# Patient Record
Sex: MALE | Race: WHITE | Employment: OTHER | URBAN - METROPOLITAN AREA
[De-identification: names, ages, dates, MRNs, and addresses within clinical notes are randomized per-mention and may not be internally consistent; named-entity substitution may affect disease eponyms.]

---

## 2017-01-11 PROCEDURE — 86480 TB TEST CELL IMMUN MEASURE: CPT | Performed by: PHYSICIAN ASSISTANT

## 2017-02-27 PROBLEM — M79.671 RIGHT FOOT PAIN: Status: ACTIVE | Noted: 2017-02-27

## 2017-02-27 PROBLEM — M20.5X1 HALLUX LIMITUS, RIGHT: Status: ACTIVE | Noted: 2017-02-27

## 2017-04-11 PROBLEM — M06.09 RHEUMATOID ARTHRITIS OF MULTIPLE SITES WITH NEGATIVE RHEUMATOID FACTOR (HCC): Status: ACTIVE | Noted: 2017-04-11

## 2017-04-12 PROBLEM — M54.41 CHRONIC RIGHT-SIDED LOW BACK PAIN WITH RIGHT-SIDED SCIATICA: Status: ACTIVE | Noted: 2017-04-12

## 2017-04-12 PROBLEM — G89.29 CHRONIC RIGHT-SIDED LOW BACK PAIN WITH RIGHT-SIDED SCIATICA: Status: ACTIVE | Noted: 2017-04-12

## 2017-05-11 PROBLEM — M54.41 ACUTE BACK PAIN WITH SCIATICA, RIGHT: Status: ACTIVE | Noted: 2017-05-11

## 2017-05-31 PROCEDURE — 81003 URINALYSIS AUTO W/O SCOPE: CPT | Performed by: INTERNAL MEDICINE

## 2017-05-31 PROCEDURE — 84100 ASSAY OF PHOSPHORUS: CPT | Performed by: INTERNAL MEDICINE

## 2017-05-31 PROCEDURE — 84156 ASSAY OF PROTEIN URINE: CPT | Performed by: INTERNAL MEDICINE

## 2017-05-31 PROCEDURE — 82310 ASSAY OF CALCIUM: CPT | Performed by: INTERNAL MEDICINE

## 2017-05-31 PROCEDURE — 82565 ASSAY OF CREATININE: CPT | Performed by: INTERNAL MEDICINE

## 2017-05-31 PROCEDURE — 82043 UR ALBUMIN QUANTITATIVE: CPT | Performed by: INTERNAL MEDICINE

## 2017-05-31 PROCEDURE — 82570 ASSAY OF URINE CREATININE: CPT | Performed by: INTERNAL MEDICINE

## 2017-05-31 PROCEDURE — 83970 ASSAY OF PARATHORMONE: CPT | Performed by: INTERNAL MEDICINE

## 2017-07-06 RX ORDER — TAMSULOSIN HYDROCHLORIDE 0.4 MG/1
CAPSULE ORAL
Qty: 30 CAPSULE | Refills: 0 | OUTPATIENT
Start: 2017-07-06

## 2017-12-04 PROBLEM — L60.0 INGROWN NAIL OF GREAT TOE OF LEFT FOOT: Status: ACTIVE | Noted: 2017-12-04

## 2018-01-09 PROCEDURE — 89055 LEUKOCYTE ASSESSMENT FECAL: CPT | Performed by: EMERGENCY MEDICINE

## 2018-01-09 PROCEDURE — 87045 FECES CULTURE AEROBIC BACT: CPT | Performed by: EMERGENCY MEDICINE

## 2018-01-09 PROCEDURE — 87046 STOOL CULTR AEROBIC BACT EA: CPT | Performed by: EMERGENCY MEDICINE

## 2018-01-09 PROCEDURE — 87427 SHIGA-LIKE TOXIN AG IA: CPT | Performed by: EMERGENCY MEDICINE

## 2018-01-22 PROCEDURE — 82043 UR ALBUMIN QUANTITATIVE: CPT | Performed by: INTERNAL MEDICINE

## 2018-01-22 PROCEDURE — 82310 ASSAY OF CALCIUM: CPT | Performed by: INTERNAL MEDICINE

## 2018-01-22 PROCEDURE — 82565 ASSAY OF CREATININE: CPT | Performed by: INTERNAL MEDICINE

## 2018-01-22 PROCEDURE — 81003 URINALYSIS AUTO W/O SCOPE: CPT | Performed by: INTERNAL MEDICINE

## 2018-01-22 PROCEDURE — 83970 ASSAY OF PARATHORMONE: CPT | Performed by: INTERNAL MEDICINE

## 2018-01-22 PROCEDURE — 84156 ASSAY OF PROTEIN URINE: CPT | Performed by: INTERNAL MEDICINE

## 2018-01-22 PROCEDURE — 82570 ASSAY OF URINE CREATININE: CPT | Performed by: INTERNAL MEDICINE

## 2018-01-22 PROCEDURE — 84100 ASSAY OF PHOSPHORUS: CPT | Performed by: INTERNAL MEDICINE

## 2018-04-25 PROCEDURE — 87338 HPYLORI STOOL AG IA: CPT | Performed by: INTERNAL MEDICINE

## 2018-07-03 PROCEDURE — 88305 TISSUE EXAM BY PATHOLOGIST: CPT | Performed by: INTERNAL MEDICINE

## 2018-08-22 PROCEDURE — 82310 ASSAY OF CALCIUM: CPT | Performed by: INTERNAL MEDICINE

## 2018-08-22 PROCEDURE — 83970 ASSAY OF PARATHORMONE: CPT | Performed by: INTERNAL MEDICINE

## 2018-08-22 PROCEDURE — 82565 ASSAY OF CREATININE: CPT | Performed by: INTERNAL MEDICINE

## 2018-08-22 PROCEDURE — 84100 ASSAY OF PHOSPHORUS: CPT | Performed by: INTERNAL MEDICINE

## 2018-09-05 PROCEDURE — 84540 ASSAY OF URINE/UREA-N: CPT | Performed by: INTERNAL MEDICINE

## 2018-09-05 PROCEDURE — 81003 URINALYSIS AUTO W/O SCOPE: CPT | Performed by: INTERNAL MEDICINE

## 2018-09-05 PROCEDURE — 82570 ASSAY OF URINE CREATININE: CPT | Performed by: INTERNAL MEDICINE

## 2018-09-05 PROCEDURE — 84156 ASSAY OF PROTEIN URINE: CPT | Performed by: INTERNAL MEDICINE

## 2018-09-05 PROCEDURE — 82043 UR ALBUMIN QUANTITATIVE: CPT | Performed by: INTERNAL MEDICINE

## 2018-09-05 PROCEDURE — 84300 ASSAY OF URINE SODIUM: CPT | Performed by: INTERNAL MEDICINE

## 2018-10-26 PROCEDURE — 82570 ASSAY OF URINE CREATININE: CPT | Performed by: INTERNAL MEDICINE

## 2018-10-26 PROCEDURE — 84100 ASSAY OF PHOSPHORUS: CPT | Performed by: INTERNAL MEDICINE

## 2018-10-26 PROCEDURE — 81003 URINALYSIS AUTO W/O SCOPE: CPT | Performed by: INTERNAL MEDICINE

## 2018-10-26 PROCEDURE — 82310 ASSAY OF CALCIUM: CPT | Performed by: INTERNAL MEDICINE

## 2018-10-26 PROCEDURE — 84300 ASSAY OF URINE SODIUM: CPT | Performed by: INTERNAL MEDICINE

## 2018-10-26 PROCEDURE — 83970 ASSAY OF PARATHORMONE: CPT | Performed by: INTERNAL MEDICINE

## 2018-10-26 PROCEDURE — 82565 ASSAY OF CREATININE: CPT | Performed by: INTERNAL MEDICINE

## 2018-10-26 PROCEDURE — 82043 UR ALBUMIN QUANTITATIVE: CPT | Performed by: INTERNAL MEDICINE

## 2018-10-26 PROCEDURE — 84156 ASSAY OF PROTEIN URINE: CPT | Performed by: INTERNAL MEDICINE

## 2018-10-26 PROCEDURE — 84540 ASSAY OF URINE/UREA-N: CPT | Performed by: INTERNAL MEDICINE

## 2018-10-29 PROBLEM — K21.9 GASTROESOPHAGEAL REFLUX DISEASE WITHOUT ESOPHAGITIS: Status: ACTIVE | Noted: 2018-10-29

## 2018-11-06 PROCEDURE — 82610 CYSTATIN C: CPT | Performed by: INTERNAL MEDICINE

## 2018-11-19 PROCEDURE — 82570 ASSAY OF URINE CREATININE: CPT | Performed by: INTERNAL MEDICINE

## 2018-11-19 PROCEDURE — 84300 ASSAY OF URINE SODIUM: CPT | Performed by: INTERNAL MEDICINE

## 2018-11-19 PROCEDURE — 84540 ASSAY OF URINE/UREA-N: CPT | Performed by: INTERNAL MEDICINE

## 2018-12-04 ENCOUNTER — HOSPITAL ENCOUNTER (OUTPATIENT)
Dept: CARDIOLOGY CLINIC | Facility: HOSPITAL | Age: 78
Discharge: HOME | End: 2018-12-04
Attending: NURSE PRACTITIONER

## 2018-12-04 DIAGNOSIS — Z13.9 ENCOUNTER FOR SCREENING: ICD-10-CM

## 2019-02-06 PROCEDURE — 81015 MICROSCOPIC EXAM OF URINE: CPT | Performed by: PHYSICIAN ASSISTANT

## 2019-02-06 PROCEDURE — 87086 URINE CULTURE/COLONY COUNT: CPT | Performed by: PHYSICIAN ASSISTANT

## 2019-07-09 PROCEDURE — 82310 ASSAY OF CALCIUM: CPT | Performed by: INTERNAL MEDICINE

## 2019-07-09 PROCEDURE — 84100 ASSAY OF PHOSPHORUS: CPT | Performed by: INTERNAL MEDICINE

## 2019-07-09 PROCEDURE — 82565 ASSAY OF CREATININE: CPT | Performed by: INTERNAL MEDICINE

## 2019-07-09 PROCEDURE — 84156 ASSAY OF PROTEIN URINE: CPT | Performed by: INTERNAL MEDICINE

## 2019-07-09 PROCEDURE — 81003 URINALYSIS AUTO W/O SCOPE: CPT | Performed by: INTERNAL MEDICINE

## 2019-07-09 PROCEDURE — 83970 ASSAY OF PARATHORMONE: CPT | Performed by: INTERNAL MEDICINE

## 2019-09-01 PROBLEM — M25.511 ACUTE PAIN OF RIGHT SHOULDER: Status: ACTIVE | Noted: 2019-09-01

## 2024-12-10 ENCOUNTER — HOSPITAL ENCOUNTER (EMERGENCY)
Facility: HOSPITAL | Age: 84
Discharge: HOME | End: 2024-12-10
Attending: EMERGENCY MEDICINE
Payer: COMMERCIAL

## 2024-12-10 ENCOUNTER — APPOINTMENT (OUTPATIENT)
Dept: GENERAL RADIOLOGY | Facility: HOSPITAL | Age: 84
End: 2024-12-10
Payer: COMMERCIAL

## 2024-12-10 VITALS
DIASTOLIC BLOOD PRESSURE: 62 MMHG | RESPIRATION RATE: 23 BRPM | HEIGHT: 70 IN | SYSTOLIC BLOOD PRESSURE: 110 MMHG | OXYGEN SATURATION: 99 % | BODY MASS INDEX: 30.58 KG/M2 | HEART RATE: 63 BPM | TEMPERATURE: 98 F | WEIGHT: 213.63 LBS

## 2024-12-10 DIAGNOSIS — R07.89 CHEST WALL PAIN: Primary | ICD-10-CM

## 2024-12-10 LAB
ALBUMIN SERPL-MCNC: 3.7 G/DL (ref 3.2–4.8)
ALBUMIN/GLOB SERPL: 0.7 {RATIO} (ref 1–2)
ALP LIVER SERPL-CCNC: 66 U/L
ALT SERPL-CCNC: 13 U/L
ANION GAP SERPL CALC-SCNC: 7 MMOL/L (ref 0–18)
AST SERPL-CCNC: 20 U/L (ref ?–34)
BASOPHILS # BLD AUTO: 0.03 X10(3) UL (ref 0–0.2)
BASOPHILS NFR BLD AUTO: 0.5 %
BILIRUB SERPL-MCNC: 1 MG/DL (ref 0.2–1.1)
BUN BLD-MCNC: 20 MG/DL (ref 9–23)
CALCIUM BLD-MCNC: 10.6 MG/DL (ref 8.7–10.4)
CHLORIDE SERPL-SCNC: 105 MMOL/L (ref 98–112)
CO2 SERPL-SCNC: 21 MMOL/L (ref 21–32)
CREAT BLD-MCNC: 1.85 MG/DL
EGFRCR SERPLBLD CKD-EPI 2021: 36 ML/MIN/1.73M2 (ref 60–?)
EOSINOPHIL # BLD AUTO: 0.16 X10(3) UL (ref 0–0.7)
EOSINOPHIL NFR BLD AUTO: 2.9 %
ERYTHROCYTE [DISTWIDTH] IN BLOOD BY AUTOMATED COUNT: 13.2 %
GLOBULIN PLAS-MCNC: 5.4 G/DL (ref 2–3.5)
GLUCOSE BLD-MCNC: 97 MG/DL (ref 70–99)
HCT VFR BLD AUTO: 41.1 %
HGB BLD-MCNC: 14 G/DL
IMM GRANULOCYTES # BLD AUTO: 0.01 X10(3) UL (ref 0–1)
IMM GRANULOCYTES NFR BLD: 0.2 %
LYMPHOCYTES # BLD AUTO: 1.38 X10(3) UL (ref 1–4)
LYMPHOCYTES NFR BLD AUTO: 25 %
MCH RBC QN AUTO: 33.1 PG (ref 26–34)
MCHC RBC AUTO-ENTMCNC: 34.1 G/DL (ref 31–37)
MCV RBC AUTO: 97.2 FL
MONOCYTES # BLD AUTO: 0.82 X10(3) UL (ref 0.1–1)
MONOCYTES NFR BLD AUTO: 14.9 %
NEUTROPHILS # BLD AUTO: 3.12 X10 (3) UL (ref 1.5–7.7)
NEUTROPHILS # BLD AUTO: 3.12 X10(3) UL (ref 1.5–7.7)
NEUTROPHILS NFR BLD AUTO: 56.5 %
OSMOLALITY SERPL CALC.SUM OF ELEC: 279 MOSM/KG (ref 275–295)
PLATELET # BLD AUTO: 237 10(3)UL (ref 150–450)
POTASSIUM SERPL-SCNC: 4.5 MMOL/L (ref 3.5–5.1)
PROT SERPL-MCNC: 9.1 G/DL (ref 5.7–8.2)
RBC # BLD AUTO: 4.23 X10(6)UL
SODIUM SERPL-SCNC: 133 MMOL/L (ref 136–145)
TROPONIN I SERPL HS-MCNC: 9 NG/L
WBC # BLD AUTO: 5.5 X10(3) UL (ref 4–11)

## 2024-12-10 PROCEDURE — 85025 COMPLETE CBC W/AUTO DIFF WBC: CPT | Performed by: EMERGENCY MEDICINE

## 2024-12-10 PROCEDURE — 99285 EMERGENCY DEPT VISIT HI MDM: CPT

## 2024-12-10 PROCEDURE — 84484 ASSAY OF TROPONIN QUANT: CPT | Performed by: EMERGENCY MEDICINE

## 2024-12-10 PROCEDURE — 36415 COLL VENOUS BLD VENIPUNCTURE: CPT

## 2024-12-10 PROCEDURE — 93005 ELECTROCARDIOGRAM TRACING: CPT

## 2024-12-10 PROCEDURE — 93010 ELECTROCARDIOGRAM REPORT: CPT

## 2024-12-10 PROCEDURE — 80053 COMPREHEN METABOLIC PANEL: CPT | Performed by: EMERGENCY MEDICINE

## 2024-12-10 PROCEDURE — 99284 EMERGENCY DEPT VISIT MOD MDM: CPT

## 2024-12-10 PROCEDURE — 71045 X-RAY EXAM CHEST 1 VIEW: CPT | Performed by: EMERGENCY MEDICINE

## 2024-12-10 NOTE — ED PROVIDER NOTES
Patient Seen in: [facility] Emergency Department      History     Chief Complaint   Patient presents with    Chest Pain Angina    Difficulty Breathing     Stated Complaint: CP and SOB x 5 days. sent by . ekg shows A fib    Subjective:   HPI      84-year-old male who presents to the emergency department complaining of chest pain and shortness of breath for the past 5 days.  The patient says that he had fallen approximately 1 week ago hitting his back and right side.  He has had continued pain ever since he went to his physical therapy appointment when they pulled him from the floor during an exercise.  He has been taking Tylenol for discomfort.  Reviewing his records he was seen yesterday 12/9/2024 and had x-rays performed that showed no rib fractures but a small effusion.  He has chronic atrial fibrillation.  Was told to come to the ER for evaluation but it became too late and he cannot drive in the dark.  He says the pain hurts with movement.  No fevers or chills.  He does have some relief when he places a heating pad on the affected right side.    Objective:     Past Medical History:    Esophageal reflux    Hemorrhoids    HIGH BLOOD PRESSURE    RA (rheumatoid arthritis) (HCC)    Unspecified essential hypertension    Visual impairment    wears glasses for distance              Past Surgical History:   Procedure Laterality Date    Appendectomy      Colonoscopy  2012    Colonoscopy N/A 6/2/2015    Procedure: COLONOSCOPY;  Surgeon: Srini Maria MD;  Location:  ENDOSCOPY    Knee replacement surgery      Right knee    Ligation of hemorrhoid(s)  7/8/2015    Other surgical history      ra nodule removal    Other surgical history      broken jaw repair    Other surgical history      shoulder surg    Skin surgery  2011     Scars to mid frontal scalp and right frontal scalp s/p removal of  & 2011                Social History     Socioeconomic History    Marital status: Single   Tobacco Use    Smoking  status: Former     Current packs/day: 0.00     Types: Cigarettes     Quit date: 1977     Years since quittin.9    Smokeless tobacco: Former   Substance and Sexual Activity    Alcohol use: Yes     Comment: 3-4 drinks per week    Drug use: No   Other Topics Concern    Caffeine Concern Yes     Comment: 1 can of pop daily    Exercise No                  Physical Exam     ED Triage Vitals [12/10/24 1031]   /70   Pulse 78   Resp 18   Temp 98.2 °F (36.8 °C)   Temp src Temporal   SpO2 98 %   O2 Device None (Room air)       Current Vitals:   Vital Signs  BP: 112/63  Pulse: 55  Resp: 17  Temp: 98.2 °F (36.8 °C)  Temp src: Temporal  MAP (mmHg): 79    Oxygen Therapy  SpO2: 100 %  O2 Device: None (Room air)        Physical Exam  General: Nontoxic 84-year-old male appears to be in no distress.  HEENT: Head is atraumatic normocephalic pupils are active to light.  Lungs: Slight decreased breath sounds right lung base.  Good air entry noted.  Equal breath sounds.  Cardiac: Heart rate is 70 with irregularity consistent with atrial fibrillation.  Reproducible pain on palpation of the right thoracic cage and upper back.  Pain worsens with movement.  Abdomen: Soft without tenderness.  Extremities: Moving all 4 without difficulty.  No deformities.  ED Course     Labs Reviewed   COMP METABOLIC PANEL (14) - Abnormal; Notable for the following components:       Result Value    Sodium 133 (*)     Creatinine 1.85 (*)     Calcium, Total 10.6 (*)     eGFR-Cr 36 (*)     Total Protein 9.1 (*)     Globulin  5.4 (*)     A/G Ratio 0.7 (*)     All other components within normal limits   TROPONIN I HIGH SENSITIVITY - Normal   CBC WITH DIFFERENTIAL WITH PLATELET   RAINBOW DRAW LAVENDER   RAINBOW DRAW LIGHT GREEN   RAINBOW DRAW BLUE     EKG    Rate, intervals and axes as noted on EKG Report.  Rate: 71  Rhythm: Atrial Fibrillation  Reading: Atrial fibrillation low voltage QRS complexes nonspecific ST-T wave changes noted          Narrative  PROCEDURE:  XR CHEST AP PORTABLE  (CPT=71045)     TECHNIQUE:  AP chest radiograph was obtained.     COMPARISON:  [facility], XR, CHEST PA   LATERAL, 3/13/2013, 1:20 PM.     INDICATIONS:  CP and SOB x 5 days. sent by . ekg shows A fib     PATIENT STATED HISTORY: (As transcribed by Technologist)  Patient offered no additional history at this time.                  FINDINGS:  Heart size is within normal limits.  Pleural spaces appear clear.  Mediastinal and hilar contours are normal.  No focal consolidation.  If clinical symptoms persist then recommend follow-up imaging.                  Impression  CONCLUSION:  See above.        LOCATION:  [facility]                 Dictated by (CST): Gustavo Jaime MD on 12/10/2024 at 11:32 AM      Finalized by (CST): Gustavo Jaime MD on 12/10/2024 at 11:33 AM                MDM    Differential diagnosis includes but is not limited to pneumothorax, pneumonia, increasing pleural effusion, cardiac ischemia, chest wall pain.  The patient appears to clinically have chest wall pain.  Will assess for other possible entities.  Troponin is normal.  CBC was normal.  Sodium 133 creatinine 1.8.  Calcium of 10.6.  Protein of 9.1 globulin 5.4.  The rest of metabolic panel was normal.  I reviewed the x-rays and agree with the radiologist report that showed heart size within normal limits pleural spaces are clear.  Mediastinal hilar contours are normal.  No focal consolidation.  The patient has chest wall pain.  Continue with Tylenol for pain control follow-up as an outpatient with primary care physician return if symptoms progress or worsen.  Use incentive spirometer.  Note to Patient  The 21st Century Cures Act makes medical notes like these available to patients in the interest of transparency. However, be advised this is a medical document and is intended as rwcn-in-gpbb communication; it is written in medical language and may appear blunt, direct, or contain abbreviations or verbiage that  are unfamiliar. Medical documents are intended to carry relevant information, facts as evident, and the clinical opinion of the practitioner.  Patient was evaluated and a screening exam was performed.   As a treating physician attending to the patient, I determined, within reasonable clinical confidence and prior to discharge, that an emergency medical condition was not or was no longer present.  There was no indication for further evaluation, treatment or admission on an emergency basis.  Comprehensive verbal and written discharge and follow-up instructions were provided to help prevent relapse or worsening.  Patient was instructed to follow-up with their primary care provider for further evaluation and treatment, but to return immediately to the ER for worsening, concerning, new, changing or persisting symptoms.  I discussed the case with the patient and they had no questions, complaints, or concerns.  Patient felt comfortable going home.  ^^Please note that this report has been produced using speech recognition software and may contain errors related to that system including, but not limited to, errors in grammar, punctuation, and spelling, as well as words and phrases that possibly may have been recognized inappropriately.  If there are any questions or concerns, contact the dictating provider for clarification.  Medical Decision Making      Disposition and Plan     Clinical Impression:  1. Chest wall pain         Disposition:  Discharge  12/10/2024  1:59 pm    Follow-up:  Fortino Ortega MD  [address] [address]  [phone]    Schedule an appointment as soon as possible for a visit in 2 day(s)            Medications Prescribed:  Current Discharge Medication List              Supplementary Documentation:

## 2024-12-10 NOTE — ED QUICK NOTES
Pt reevaluated by dr. Pickett. Pt informed of his test report and plan of care. Verbalizing understanding

## 2024-12-12 LAB
Q-T INTERVAL: 378 MS
QRS DURATION: 88 MS
QTC CALCULATION (BEZET): 410 MS
R AXIS: 8 DEGREES
T AXIS: 28 DEGREES
VENTRICULAR RATE: 71 BPM

## 2025-01-21 PROCEDURE — 88364 INSITU HYBRIDIZATION (FISH): CPT | Performed by: PATHOLOGY

## 2025-01-21 PROCEDURE — 88365 INSITU HYBRIDIZATION (FISH): CPT | Performed by: PATHOLOGY

## 2025-01-24 ENCOUNTER — LAB REQUISITION (OUTPATIENT)
Age: 85
End: 2025-01-24
Payer: MEDICARE

## 2025-01-24 DIAGNOSIS — D48.9 NEOPLASM OF UNCERTAIN BEHAVIOR: ICD-10-CM

## 2025-02-07 ENCOUNTER — LAB REQUISITION (OUTPATIENT)
Dept: LAB | Facility: HOSPITAL | Age: 85
End: 2025-02-07
Payer: MEDICARE

## 2025-02-07 DIAGNOSIS — C90.00 MULTIPLE MYELOMA NOT HAVING ACHIEVED REMISSION (HCC): ICD-10-CM

## 2025-02-07 PROCEDURE — 88364 INSITU HYBRIDIZATION (FISH): CPT | Performed by: PATHOLOGY

## 2025-02-07 PROCEDURE — 88365 INSITU HYBRIDIZATION (FISH): CPT | Performed by: PATHOLOGY

## 2025-02-07 PROCEDURE — 88185 FLOWCYTOMETRY/TC ADD-ON: CPT | Performed by: INTERNAL MEDICINE

## 2025-02-07 PROCEDURE — 88184 FLOWCYTOMETRY/ TC 1 MARKER: CPT | Performed by: INTERNAL MEDICINE

## 2025-02-11 ENCOUNTER — LAB REQUISITION (OUTPATIENT)
Age: 85
End: 2025-02-11
Payer: MEDICARE

## 2025-02-11 DIAGNOSIS — D48.9 NEOPLASM OF UNCERTAIN BEHAVIOR: ICD-10-CM

## 2025-02-12 LAB
CD10 CELLS NFR SPEC: <1 %
CD10/CD19: <1 %
CD19 CELLS NFR SPEC: 9 %
CD19+ MM: 47 %
CD19+/CD200+: 7 %
CD19+/CD38+ MM: 47 %
CD2 CELLS NFR SPEC: 92 %
CD20 CELLS NFR SPEC: 9 %
CD200 CELLS: 14 %
CD3 CELLS NFR SPEC: 86 %
CD3+/TCRGD+: 3 %
CD3+CD4+ CELLS NFR SPEC: 60 %
CD3+CD4+ CELLS/CD3+CD8+ CLL SPEC: 2.6
CD3+CD8+ CELLS NFR SPEC: 23 %
CD3-/CD56+: 8 %
CD34 CELLS NFR SPEC: <1 %
CD38 CELLS NFR SPEC: 2 %
CD38+/CD19+: <1 %
CD45 CELLS NFR SPEC: 100 %
CD45-/CD38+ KAPPA: 47 %
CD45-/CD38+ LAMBDA: 6 %
CD5 CELLS NFR SPEC: 81 %
CD5/CD19 CELLS: <1 %
CD56 MM: 66 %
CD7 CELLS NFR SPEC: 76 %
CELL SURF KAPPA/LAMBDA RATIO: 2
CELL SURF LAMBDA LIGHT CHAIN: 3 %
CELL SURFACE KAPPA LIGHT CHAIN: 6 %
TCR G-D CELLS NFR SPEC: 3 %

## 2025-06-11 ENCOUNTER — APPOINTMENT (OUTPATIENT)
Dept: ULTRASOUND IMAGING | Age: 85
End: 2025-06-11
Attending: EMERGENCY MEDICINE
Payer: MEDICARE

## 2025-06-11 ENCOUNTER — HOSPITAL ENCOUNTER (INPATIENT)
Facility: HOSPITAL | Age: 85
LOS: 7 days | Discharge: HOME HEALTH | End: 2025-06-19
Attending: EMERGENCY MEDICINE | Admitting: HOSPITALIST
Payer: MEDICARE

## 2025-06-11 DIAGNOSIS — C90.00 MULTIPLE MYELOMA, REMISSION STATUS UNSPECIFIED (HCC): ICD-10-CM

## 2025-06-11 DIAGNOSIS — Z86.79 HISTORY OF ATRIAL FIBRILLATION: ICD-10-CM

## 2025-06-11 DIAGNOSIS — I10 ESSENTIAL HYPERTENSION: ICD-10-CM

## 2025-06-11 DIAGNOSIS — L03.116 CELLULITIS OF LEFT LOWER EXTREMITY: Primary | ICD-10-CM

## 2025-06-11 DIAGNOSIS — I48.20 ATRIAL FIBRILLATION, CHRONIC (HCC): ICD-10-CM

## 2025-06-11 PROBLEM — E87.1 HYPONATREMIA: Status: ACTIVE | Noted: 2025-06-11

## 2025-06-11 PROBLEM — R73.9 HYPERGLYCEMIA: Status: ACTIVE | Noted: 2025-06-11

## 2025-06-11 LAB
ALBUMIN SERPL-MCNC: 3.8 G/DL (ref 3.2–4.8)
ALBUMIN/GLOB SERPL: 1.5 {RATIO} (ref 1–2)
ALP LIVER SERPL-CCNC: 86 U/L (ref 45–117)
ALT SERPL-CCNC: 11 U/L (ref 10–49)
ANION GAP SERPL CALC-SCNC: 8 MMOL/L (ref 0–18)
APTT PPP: 39.8 SECONDS (ref 23–36)
AST SERPL-CCNC: 14 U/L (ref ?–34)
BASOPHILS # BLD AUTO: 0.01 X10(3) UL (ref 0–0.2)
BASOPHILS NFR BLD AUTO: 0.1 %
BILIRUB SERPL-MCNC: 0.9 MG/DL (ref 0.2–1.1)
BUN BLD-MCNC: 19 MG/DL (ref 9–23)
CALCIUM BLD-MCNC: 8.5 MG/DL (ref 8.7–10.6)
CHLORIDE SERPL-SCNC: 104 MMOL/L (ref 98–112)
CO2 SERPL-SCNC: 22 MMOL/L (ref 21–32)
CREAT BLD-MCNC: 1.29 MG/DL (ref 0.7–1.3)
EGFRCR SERPLBLD CKD-EPI 2021: 55 ML/MIN/1.73M2 (ref 60–?)
EOSINOPHIL # BLD AUTO: 0 X10(3) UL (ref 0–0.7)
EOSINOPHIL NFR BLD AUTO: 0 %
ERYTHROCYTE [DISTWIDTH] IN BLOOD BY AUTOMATED COUNT: 15.9 %
GLOBULIN PLAS-MCNC: 2.5 G/DL (ref 2–3.5)
GLUCOSE BLD-MCNC: 133 MG/DL (ref 70–99)
GLUCOSE BLD-MCNC: 150 MG/DL (ref 70–99)
HCT VFR BLD AUTO: 38 % (ref 39–53)
HGB BLD-MCNC: 12.6 G/DL (ref 13–17.5)
IMM GRANULOCYTES # BLD AUTO: 0.06 X10(3) UL (ref 0–1)
IMM GRANULOCYTES NFR BLD: 0.6 %
INR BLD: 1.35 (ref 0.8–1.2)
LACTATE SERPL-SCNC: 1.8 MMOL/L (ref 0.5–2)
LYMPHOCYTES # BLD AUTO: 0.29 X10(3) UL (ref 1–4)
LYMPHOCYTES NFR BLD AUTO: 3.1 %
MCH RBC QN AUTO: 33.6 PG (ref 26–34)
MCHC RBC AUTO-ENTMCNC: 33.2 G/DL (ref 31–37)
MCV RBC AUTO: 101.3 FL (ref 80–100)
MONOCYTES # BLD AUTO: 0.34 X10(3) UL (ref 0.1–1)
MONOCYTES NFR BLD AUTO: 3.6 %
NEUTROPHILS # BLD AUTO: 8.65 X10 (3) UL (ref 1.5–7.7)
NEUTROPHILS # BLD AUTO: 8.65 X10(3) UL (ref 1.5–7.7)
NEUTROPHILS NFR BLD AUTO: 92.6 %
OSMOLALITY SERPL CALC.SUM OF ELEC: 283 MOSM/KG (ref 275–295)
PLATELET # BLD AUTO: 362 10(3)UL (ref 150–450)
POTASSIUM SERPL-SCNC: 5 MMOL/L (ref 3.5–5.1)
PROT SERPL-MCNC: 6.3 G/DL (ref 5.7–8.2)
PROTHROMBIN TIME: 16.4 SECONDS (ref 11.6–14.8)
RBC # BLD AUTO: 3.75 X10(6)UL (ref 3.8–5.8)
SODIUM SERPL-SCNC: 134 MMOL/L (ref 136–145)
WBC # BLD AUTO: 9.4 X10(3) UL (ref 4–11)

## 2025-06-11 PROCEDURE — 99285 EMERGENCY DEPT VISIT HI MDM: CPT

## 2025-06-11 PROCEDURE — 83605 ASSAY OF LACTIC ACID: CPT | Performed by: EMERGENCY MEDICINE

## 2025-06-11 PROCEDURE — 87040 BLOOD CULTURE FOR BACTERIA: CPT | Performed by: EMERGENCY MEDICINE

## 2025-06-11 PROCEDURE — 85730 THROMBOPLASTIN TIME PARTIAL: CPT | Performed by: EMERGENCY MEDICINE

## 2025-06-11 PROCEDURE — 96365 THER/PROPH/DIAG IV INF INIT: CPT

## 2025-06-11 PROCEDURE — 96361 HYDRATE IV INFUSION ADD-ON: CPT

## 2025-06-11 PROCEDURE — 36415 COLL VENOUS BLD VENIPUNCTURE: CPT

## 2025-06-11 PROCEDURE — 85025 COMPLETE CBC W/AUTO DIFF WBC: CPT | Performed by: EMERGENCY MEDICINE

## 2025-06-11 PROCEDURE — 93971 EXTREMITY STUDY: CPT | Performed by: EMERGENCY MEDICINE

## 2025-06-11 PROCEDURE — 80053 COMPREHEN METABOLIC PANEL: CPT | Performed by: EMERGENCY MEDICINE

## 2025-06-11 PROCEDURE — 85610 PROTHROMBIN TIME: CPT | Performed by: EMERGENCY MEDICINE

## 2025-06-11 PROCEDURE — 82962 GLUCOSE BLOOD TEST: CPT

## 2025-06-11 RX ORDER — VITAMIN E 268 MG
400 CAPSULE ORAL DAILY
Status: DISCONTINUED | OUTPATIENT
Start: 2025-06-12 | End: 2025-06-19

## 2025-06-11 RX ORDER — METOCLOPRAMIDE HYDROCHLORIDE 5 MG/ML
5 INJECTION INTRAMUSCULAR; INTRAVENOUS EVERY 8 HOURS PRN
Status: DISCONTINUED | OUTPATIENT
Start: 2025-06-11 | End: 2025-06-19

## 2025-06-11 RX ORDER — LISINOPRIL 2.5 MG/1
2.5 TABLET ORAL DAILY
Status: DISCONTINUED | OUTPATIENT
Start: 2025-06-12 | End: 2025-06-19

## 2025-06-11 RX ORDER — FUROSEMIDE 10 MG/ML
40 INJECTION INTRAMUSCULAR; INTRAVENOUS
Status: DISCONTINUED | OUTPATIENT
Start: 2025-06-11 | End: 2025-06-16

## 2025-06-11 RX ORDER — DILTIAZEM HYDROCHLORIDE 120 MG/1
360 CAPSULE, EXTENDED RELEASE ORAL DAILY
Status: DISCONTINUED | OUTPATIENT
Start: 2025-06-11 | End: 2025-06-18

## 2025-06-11 RX ORDER — ACETAMINOPHEN 500 MG
500 TABLET ORAL EVERY 4 HOURS PRN
Status: DISCONTINUED | OUTPATIENT
Start: 2025-06-11 | End: 2025-06-19

## 2025-06-11 RX ORDER — HYDROMORPHONE HYDROCHLORIDE 1 MG/ML
0.5 INJECTION, SOLUTION INTRAMUSCULAR; INTRAVENOUS; SUBCUTANEOUS EVERY 30 MIN PRN
Status: ACTIVE | OUTPATIENT
Start: 2025-06-11 | End: 2025-06-11

## 2025-06-11 RX ORDER — CHOLECALCIFEROL (VITAMIN D3) 50 MCG
2000 TABLET ORAL DAILY
Status: DISCONTINUED | OUTPATIENT
Start: 2025-06-11 | End: 2025-06-19

## 2025-06-11 RX ORDER — TAMSULOSIN HYDROCHLORIDE 0.4 MG/1
0.4 CAPSULE ORAL 2 TIMES DAILY
Status: DISCONTINUED | OUTPATIENT
Start: 2025-06-11 | End: 2025-06-19

## 2025-06-11 RX ORDER — ONDANSETRON 2 MG/ML
4 INJECTION INTRAMUSCULAR; INTRAVENOUS EVERY 4 HOURS PRN
Status: DISCONTINUED | OUTPATIENT
Start: 2025-06-11 | End: 2025-06-11 | Stop reason: ALTCHOICE

## 2025-06-11 RX ORDER — ONDANSETRON 2 MG/ML
4 INJECTION INTRAMUSCULAR; INTRAVENOUS EVERY 6 HOURS PRN
Status: DISCONTINUED | OUTPATIENT
Start: 2025-06-11 | End: 2025-06-19

## 2025-06-11 NOTE — ED PROVIDER NOTES
Patient Seen in: [facility] In [place]        History  Chief Complaint   Patient presents with    Swelling Edema     Stated Complaint: left foot swelling and blister. red and hot to touch per pt    Subjective:   HPI          84-year-old male who presents to the emergency department complaining of left lower extremity swelling redness and blistering.  The patient that he has been noticing swelling for over a month.  He has noticed that the foot developed a blister that seem to rupture on the dorsum of the foot.  The daughter shows me a picture of his leg from 2 days ago and says that the redness has started tracking up his lower extremity.  He denies any chest pain or shortness of breath.  No trauma to the extremity.  He does have a history of multiple myeloma as well as rheumatoid arthritis.  He has chronic atrial fibrillation and is on Xarelto.  Reviewing his records he was seen earlier today in his oncology office, 6/11/2025, for his edema of the lower extremities.        Objective:     Past Medical History:    Atrial fibrillation (HCC)    Esophageal reflux    Hemorrhoids    HIGH BLOOD PRESSURE    Multiple myeloma (HCC)    RA (rheumatoid arthritis) (HCC)    Unspecified essential hypertension    Visual impairment    wears glasses for distance              Past Surgical History:   Procedure Laterality Date    Appendectomy      Colonoscopy  2012    Colonoscopy N/A 6/2/2015    Procedure: COLONOSCOPY;  Surgeon: Srini Maria MD;  Location:  ENDOSCOPY    Knee replacement surgery      Right knee    Ligation of hemorrhoid(s)  7/8/2015    Other surgical history      ra nodule removal    Other surgical history      broken jaw repair    Other surgical history      shoulder surg    Skin surgery  2011     Scars to mid frontal scalp and right frontal scalp s/p removal of  & 2011                Social History     Socioeconomic History    Marital status: Single   Tobacco Use    Smoking status: Former      Current packs/day: 0.00     Types: Cigarettes     Quit date: 1977     Years since quittin.4    Smokeless tobacco: Former   Vaping Use    Vaping status: Never Used   Substance and Sexual Activity    Alcohol use: Yes     Comment: 14    Drug use: No   Other Topics Concern    Caffeine Concern Yes     Comment: 1 can of pop daily    Exercise No                                Physical Exam    ED Triage Vitals   BP 25 1607 106/74   Pulse 25 1607 110   Resp 25 1607 20   Temp 25 1607 98.1 °F (36.7 °C)   Temp src 25 1607 Oral   SpO2 25 1607 99 %   O2 Device 25 1717 None (Room air)       Current Vitals:   Vital Signs  BP: 110/80  Pulse: 95  Resp: 20  Temp: 98.1 °F (36.7 °C)  Temp src: Oral    Oxygen Therapy  SpO2: 100 %  O2 Device: None (Room air)            Physical Exam  General: Nontoxic 84-year-old male appears to be in no distress.  Lungs: Equal chest rise with breathing.  No tachypnea.  Cardiac: Heart rate of 100 irregular consistent with atrial fibrillation.  Abdomen: No abdominal breathing.  Extremities: Bilateral edema.  Left leg greater than right.  There is erythema with warmth to palpation along the left lower extremity to the mid shin.  There is a deroofed bullae on the dorsum of the left foot with some purulent material noted.        ED Course  Labs Reviewed   COMP METABOLIC PANEL (14) - Abnormal; Notable for the following components:       Result Value    Glucose 150 (*)     Sodium 134 (*)     Calcium, Total 8.5 (*)     eGFR-Cr 55 (*)     All other components within normal limits   CBC WITH DIFFERENTIAL WITH PLATELET - Abnormal; Notable for the following components:    RBC 3.75 (*)     HGB 12.6 (*)     HCT 38.0 (*)     .3 (*)     Neutrophil Absolute Prelim 8.65 (*)     Neutrophil Absolute 8.65 (*)     Lymphocyte Absolute 0.29 (*)     All other components within normal limits   PTT, ACTIVATED - Abnormal; Notable for the following components:    PTT 39.8  (*)     All other components within normal limits   PROTHROMBIN TIME (PT) - Abnormal; Notable for the following components:    PT 16.4 (*)     INR 1.35 (*)     All other components within normal limits   LACTIC ACID, PLASMA - Normal   BLOOD CULTURE   BLOOD CULTURE   Narrative  PROCEDURE:  US VENOUS DOPPLER LEG LEFT - DIAG IMG (CPT=93971)     COMPARISON:  None.     INDICATIONS:  left foot swelling and blister. red and hot to touch per pt     TECHNIQUE:  Real time, grey scale, and duplex ultrasound was used to evaluate the lower extremity venous system. B-mode two-dimensional images of the vascular structures, Doppler spectral analysis, and color flow.  Doppler imaging were performed.  The  following veins were imaged:  Common, deep, and superficial femoral, popliteal, sapheno-femoral junction, posterior tibial veins, and the contralateral common femoral vein.     PATIENT STATED HISTORY: (As transcribed by Technologist)  Patient states he has had bilateral lower extremity swelling for over a month and left lower leg redness/blister recently.         FINDINGS:    EXTREMITY EXAMINED:  Left lower extremity  SAPHENOFEMORAL JUNCTION:  No reflux.  THROMBI:  None visible.  COMPRESSION:  Normal compressibility, phasicity, and augmentation.  OTHER:  Subcutaneous edema extends from the level of the knee to the ankle.                  Impression  CONCLUSION:    1. No sonographic evidence for deep venous thrombosis involving the left lower extremity.        LOCATION:  [facility]           Dictated by (CST): Inga Loyd MD on 6/11/2025 at 5:12 PM                             MDM   Differential diagnose includes is not limited to DVT of the lower extremity, cellulitis, renal failure.  The patient had laboratories that were sent.  Clinically appears cellulitic.  Ultrasound performed to assess for DVT.  The patient is on anticoagulation with Xarelto for his A-fib.  With the extent of the cellulitis he will likely need hospitalization for  IV antibiotic therapy.  Will assess for sepsis.  Lactic acid was only 1.8.  He is not septic.  Sepsis bolus was discontinued.  Hemoglobin of 12.6 medic at 38.  Glucose 157 134 calcium of 8.5 INR 1.3 PTT of 39.  Ultrasound  I reviewed the x-rays and agree with the radiologist report that showed no sonographic evidence of deep venous thrombosis of all of the left lower extremity  The patient appears to have cellulitis of the lower extremity.  He will be admitted to the hospital for continued IV antibiotic therapy.  I spoke to the hospitalist service via [employer].  He would like to go by private car.  He was admitted in good condition.  Admission disposition: 6/11/2025  5:41 PM           Medical Decision Making      Disposition and Plan     Clinical Impression:  1. Cellulitis of left lower extremity    2. Multiple myeloma, remission status unspecified (HCC)    3. Atrial fibrillation, chronic (HCC)         Disposition:  Admit  6/11/2025  5:41 pm    Follow-up:  No follow-up provider specified.        Medications Prescribed:  Current Discharge Medication List                         Hospital Problems       Present on Admission  Date Reviewed: 2/24/2022          ICD-10-CM Noted POA    * (Principal) Cellulitis of left lower extremity L03.116 6/11/2025 Unknown    Hyperglycemia R73.9 6/11/2025 Yes    Hyponatremia E87.1 6/11/2025 Yes

## 2025-06-11 NOTE — ED QUICK NOTES
Orders for admission, patient is aware of plan and ready to go upstairs. Any questions, please call ED RN Wilman at extension [phone].     Patient Covid vaccination status: Fully vaccinated     COVID Test Ordered in ED: None    COVID Suspicion at Admission: N/A    Running Infusions: Medication Infusions[1]     Mental Status/LOC at time of transport: A&O*4/4    Other pertinent information:   CIWA score: N/A   NIH score:  N/A             [1]

## 2025-06-12 ENCOUNTER — APPOINTMENT (OUTPATIENT)
Dept: CV DIAGNOSTICS | Facility: HOSPITAL | Age: 85
End: 2025-06-12
Attending: HOSPITALIST
Payer: MEDICARE

## 2025-06-12 LAB
ANION GAP SERPL CALC-SCNC: 9 MMOL/L (ref 0–18)
BASOPHILS # BLD AUTO: 0.02 X10(3) UL (ref 0–0.2)
BASOPHILS NFR BLD AUTO: 0.2 %
BUN BLD-MCNC: 23 MG/DL (ref 9–23)
CALCIUM BLD-MCNC: 8.3 MG/DL (ref 8.7–10.6)
CHLORIDE SERPL-SCNC: 106 MMOL/L (ref 98–112)
CO2 SERPL-SCNC: 25 MMOL/L (ref 21–32)
CREAT BLD-MCNC: 1.35 MG/DL (ref 0.7–1.3)
EGFRCR SERPLBLD CKD-EPI 2021: 52 ML/MIN/1.73M2 (ref 60–?)
EOSINOPHIL # BLD AUTO: 0 X10(3) UL (ref 0–0.7)
EOSINOPHIL NFR BLD AUTO: 0 %
ERYTHROCYTE [DISTWIDTH] IN BLOOD BY AUTOMATED COUNT: 15.7 %
GLUCOSE BLD-MCNC: 116 MG/DL (ref 70–99)
HCT VFR BLD AUTO: 32.2 % (ref 39–53)
HGB BLD-MCNC: 10.8 G/DL (ref 13–17.5)
IMM GRANULOCYTES # BLD AUTO: 0.07 X10(3) UL (ref 0–1)
IMM GRANULOCYTES NFR BLD: 0.8 %
LYMPHOCYTES # BLD AUTO: 0.59 X10(3) UL (ref 1–4)
LYMPHOCYTES NFR BLD AUTO: 6.4 %
MAGNESIUM SERPL-MCNC: 2 MG/DL (ref 1.6–2.6)
MCH RBC QN AUTO: 33 PG (ref 26–34)
MCHC RBC AUTO-ENTMCNC: 33.5 G/DL (ref 31–37)
MCV RBC AUTO: 98.5 FL (ref 80–100)
MONOCYTES # BLD AUTO: 1.09 X10(3) UL (ref 0.1–1)
MONOCYTES NFR BLD AUTO: 11.8 %
NEUTROPHILS # BLD AUTO: 7.46 X10 (3) UL (ref 1.5–7.7)
NEUTROPHILS # BLD AUTO: 7.46 X10(3) UL (ref 1.5–7.7)
NEUTROPHILS NFR BLD AUTO: 80.8 %
OSMOLALITY SERPL CALC.SUM OF ELEC: 295 MOSM/KG (ref 275–295)
PLATELET # BLD AUTO: 322 10(3)UL (ref 150–450)
POTASSIUM SERPL-SCNC: 4.3 MMOL/L (ref 3.5–5.1)
RBC # BLD AUTO: 3.27 X10(6)UL (ref 3.8–5.8)
SODIUM SERPL-SCNC: 140 MMOL/L (ref 136–145)
WBC # BLD AUTO: 9.2 X10(3) UL (ref 4–11)

## 2025-06-12 PROCEDURE — 97530 THERAPEUTIC ACTIVITIES: CPT

## 2025-06-12 PROCEDURE — 80048 BASIC METABOLIC PNL TOTAL CA: CPT | Performed by: HOSPITALIST

## 2025-06-12 PROCEDURE — 97535 SELF CARE MNGMENT TRAINING: CPT

## 2025-06-12 PROCEDURE — 99213 OFFICE O/P EST LOW 20 MIN: CPT

## 2025-06-12 PROCEDURE — 93306 TTE W/DOPPLER COMPLETE: CPT | Performed by: HOSPITALIST

## 2025-06-12 PROCEDURE — 85025 COMPLETE CBC W/AUTO DIFF WBC: CPT | Performed by: HOSPITALIST

## 2025-06-12 PROCEDURE — 97161 PT EVAL LOW COMPLEX 20 MIN: CPT

## 2025-06-12 PROCEDURE — 97165 OT EVAL LOW COMPLEX 30 MIN: CPT

## 2025-06-12 PROCEDURE — 83735 ASSAY OF MAGNESIUM: CPT | Performed by: HOSPITALIST

## 2025-06-12 RX ORDER — LORAZEPAM 0.5 MG/1
0.5 TABLET ORAL NIGHTLY PRN
Status: DISCONTINUED | OUTPATIENT
Start: 2025-06-12 | End: 2025-06-19

## 2025-06-12 RX ORDER — LORAZEPAM 0.5 MG/1
0.5 TABLET ORAL NIGHTLY PRN
COMMUNITY

## 2025-06-12 RX ORDER — ACYCLOVIR 200 MG/1
200 CAPSULE ORAL 2 TIMES DAILY
COMMUNITY

## 2025-06-12 RX ORDER — ALLOPURINOL 100 MG/1
100 TABLET ORAL DAILY
COMMUNITY

## 2025-06-12 NOTE — CM/SW NOTE
SW met with patient at bedside to discuss DC planning. SW presented him with home health choice list and he is agreeable to [facility]. SW reserved [facility] in [facility] and updated AVS.     SW will continue to follow for plan of care changes and remain available for any additional DC needs or concerns.     Irais Mcclain MSW, LSW  Discharge Planner   [phone]

## 2025-06-12 NOTE — PLAN OF CARE
Pt A/O x4. VSS. Afebrile. Pt denied pain throughout day. Medications admin per MAR. Pt seen by PT, ambulated safely in room, up to chair. Wound care on consult and at bedside. Wound sites CDI. Pt repositioned in bed. Pillow support/waffle cushion provided. Pt's family at bedside. Fall and safety precautions in place. Call light within reach.

## 2025-06-12 NOTE — DIETARY NOTE
[facility]   part of [facility]    NUTRITION ASSESSMENT    Unable to diagnose malnutrition criteria at this time.      NUTRITION INTERVENTION:    RD nutrition Care Plan- See RD nutrition assessment for additional recommendations  Meal and Snacks - continue cardiac diet. Monitor and encourage adequate PO intake.   Medical Food Supplements - Ensure Max daily. Rationale/use for oral supplements discussed.      PATIENT STATUS: 06/12/25 Pt is a 85 yo male with PMH of a-fib, GERD, HTN, multiple myeloma, rheumatoid arthritis, who presented with left leg swelling and erythema. This started off as a blister that then started draining, then worsened with redness extending up his leg. Pt assessed due to pressure wounds. Pt reports a good appetite. He is on a cardiac diet which is appropriate. Pt reports a stable weight. No N/V/D. Pt has LLE and RLEfragile edema. Pt was receptive to receiving Ensure Max chocolate 1 x per day for breakfast. Expect patient to meet nutritionally needs through PO intake.       ANTHROPOMETRICS:  Ht: 175.3 cm (5' 9\")  Wt: 93.1 kg (205 lb 4.8 oz).   BMI: Body mass index is 30.32 kg/m².  IBW: 72.7 kg      WEIGHT HISTORY:   Weight loss: No    Wt Readings from Last 10 Encounters:   06/11/25 93.1 kg (205 lb 4.8 oz)   12/10/24 96.9 kg (213 lb 9.6 oz)   02/24/22 98.4 kg (217 lb)   02/18/22 101.4 kg (223 lb 9.6 oz)   01/11/22 97.5 kg (215 lb)   08/18/21 94.8 kg (209 lb)   04/20/21 96.8 kg (213 lb 6.4 oz)   03/02/21 95.3 kg (210 lb)   02/19/21 93.9 kg (207 lb)   02/18/21 93.9 kg (207 lb)        NUTRITION:  Diet:       Procedures    Cardiac diet Cardiac; Is Patient on Accuchecks? No      Food Allergies: No  Cultural/Ethnic/Rastafarian Preferences Addressed: Yes    Percent Meals Eaten (last 3 days)       None            GI system review: WNL Last BM Date: 06/10/25  Skin and wounds: LLE stage II pressure injury, sacral stage II    NUTRITION RELATED PHYSICAL FINDINGS:     1. Body Fat/Muscle Mass: no  wasting noted      2. Fluid Accumulation: lower extremity edema 3+ and non-pitting    NUTRITION PRESCRIPTION: 72.7 kg IBW  Calories: 9088-1352 calories/day (30-35 kcal/kg)  Protein:  grams protein/day (1.2-1.5 grams protein per kg)  Fluid: ~1 ml/kcal or per MD discretion    NUTRITION DIAGNOSIS/PROBLEM:  Increased nutrient needs related to increased nutritional demands for healing as evidenced by stage II pressure ulcers      MONITOR AND EVALUATE/NUTRITION GOALS:  PO intake of 75% of meals TID - New  PO intake of 75% of oral nutrition supplement/s - New  Weight stable within 1 to 2 lbs during admission - New  Provide nutrition adequate for wound healing - New      MEDICATIONS:  Abx, Furosemide, vit D, vit E    LABS:  POC     Pt is at Low nutrition risk      Fareed Tanner RD, LDN  Clinical Nutrition   Available via Epic secure chat

## 2025-06-12 NOTE — PROGRESS NOTES
NURSING ADMISSION NOTE      Patient admitted via Wheelchair accompanied by his niece and staff, brought in from [place] ER due to left leg cellulitis, both LE is swollen, wound to dorsal part of left foot noted, per patient it started like a blister that eventually popped open, wound bed  almost covered with yellowish slough, wound covered  with aquacel, legs elevated, will consult wound RN. Seen by hospitalist, ID notified of the consult, Plan of care reviewed with the patient, verbalized understanding.  Oriented to room.  Safety precautions initiated. Bed in low position.  Call light in reach. Fall prec in place.

## 2025-06-12 NOTE — DISCHARGE INSTRUCTIONS
Sometimes managing your health at home requires assistance.  The [facility] team has recognized your preference to use [facility].  They can be reached by phone at [phone].  The fax number for your reference is [phone].. A representative from the home health agency will contact you or your family to schedule your first visit.      Wound Cleaning and Dressings:  Showering directions: May shower and/or cleanse wound with mild soap and water  Wound cleansing:  Cleanse with normal saline or wound cleanser  Wound cleaning frequency: Every 48 hours  Wound product: Honey gel and Bordered foam  Dressing change frequency:  Change dressing every other day and/or as needed  Enzymatic agent:  Honey    LLE:  Wound cleansing:  Cleanse with normal saline or wound cleanser  Wound cleaning frequency: Daily  Wound product: Honey gel, Alginate, ABD pad, Kerlix, and Zinc  Dressing change frequency:  Change dressing daily and/or PRN     Periwound moist, mildly macerated. Recommend covering periwound area with zinc cream, then applying honey gel to the wound bed and covering with alginate and ABD pad. Wrapped with kerlix gauze and ace wrap.    Compression Therapy: Ace wrap     Additional Notes:  Recommended that he schedule in outpatient clinic - business card with contact details provided.     Recommendations: If patient is discharged home, may follow up with home health and/or outpatient wound care clinic.

## 2025-06-12 NOTE — CONSULTS
Infectious Disease Initial Consultation      Date of admission: 6/11/2025  3:57 PM     Date of service: 06/12/25 7:48 AM    Consult requested by: Markell Johnson MD    Reason for consult: Lower extremity cellulitis    Chief complaint: Lower extremity swelling redness    History of present illness: Oswaldo Piedra is a 84 year old male with history of atrial fibrillation, GERD, hypertension, multiple [place], rheumatoid arthritis, who presents here with left leg swelling and erythema.  This started off as a blister that then started draining, now with worsening redness extending up his leg.    In the emergency room, the patient was afebrile, hemodynamically stable.  Labs revealed mild anemia but otherwise unremarkable CBC.  BMP showed sodium 134.  Otherwise unremarkable.  LFTs unremarkable.  Blood cultures obtained, remain negative to date.  Duplex of the left lower extremity did not show any DVT.  The patient was started on IV cefazolin with improvement in his cellulitis.    Review of systems:  All other components of the review of systems are negative, except those described in the history of present illness.     Past Medical History[1]  Past Surgical History[2]  Short Social Hx on File[3]  Family History[4]  Reviewed, see above    Medications:  Current Hospital Medications[5]     Allergies:  Allergies[6]    Physical Exam:  Vitals:    06/12/25 0720   BP: 93/56   Pulse: 70   Resp: 18   Temp: 97.4 °F (36.3 °C)     Vitals signs and nursing note reviewed.   Constitutional:       Appearance: Normal appearance.   HENT:      Head: Normocephalic and atraumatic.      Mouth/Throat: Normal dentition     Mouth: Mucous membranes are moist.   Neck:      Musculoskeletal: Neck supple.   Cardiovascular:      Rate and Rhythm: Normal rate.      Heart sounds: Normal heart sounds. No murmur. No friction rub. No gallop.    Pulmonary:      Effort: Pulmonary effort is normal. No respiratory distress.      Breath sounds: Normal  breath sounds. No stridor. No wheezing, rhonchi or rales.   Chest:      Chest wall: No tenderness.   Abdominal:      General: Abdomen is flat. There is no distension.      Palpations: Abdomen is soft. There is no mass.      Tenderness: There is no tenderness. There is no guarding or rebound.      Hernia: No hernia is present.   Musculoskeletal:      Right lower leg: No edema.      Left lower leg: No edema.   Skin:     General: Skin is warm and dry.   Neurological:      General: No focal deficit present.      Mental Status: Alert and oriented to person, place, and time.           Laboratory data:  I have independently reviewed all lab results; including old microbiological results.  Lab Results   Component Value Date    WBC 9.2 06/12/2025    HGB 10.8 06/12/2025    HCT 32.2 06/12/2025    .0 06/12/2025    CREATSERUM 1.35 06/12/2025    BUN 23 06/12/2025     06/12/2025    K 4.3 06/12/2025     06/12/2025    CO2 25.0 06/12/2025     06/12/2025    CA 8.3 06/12/2025    ALB 3.8 06/11/2025    ALKPHO 86 06/11/2025    BILT 0.9 06/11/2025    TP 6.3 06/11/2025    AST 14 06/11/2025    ALT 11 06/11/2025    PTT 39.8 06/11/2025    INR 1.35 06/11/2025    MG 2.0 06/12/2025        Recent Labs   Lab 06/12/25  0607   RBC 3.27*   HGB 10.8*   HCT 32.2*   MCV 98.5   MCH 33.0   MCHC 33.5   RDW 15.7   NEPRELIM 7.46   WBC 9.2   .0       Microbiology data:  No results found for this visit on 06/11/25.      Radiology:  I have reviewed all imaging data available independently.   Duplex of the left lower extremity on 6/11/2025:  No evidence of DVT    Impression:  Oswaldo Piedra is a 84 year old male with     Left lower extremity cellulitis in the setting of volume overload and blistering  Improving with cefazolin  Volume overload  Diuresis as per primary team    Recommendations:     Continue IV cefazolin for now  Leg elevation  Please consult wound care given infected blister on the top of his foot  Diuresis  as per primary team  Continue to monitor daily labs for antibiotic toxicity  Further recommendations will depend on the above work-up and clinical progress     The plan of care was discussed with the primary hospital team, Markell Johnson MD     Recommendations were also discussed with the patient; all questions were answered.     Thank you for this consultation. Please don't hesitate to call the ID team for questions or any acute changes in patient's clinical condition.    Please note that this report has been produced using speech recognition software and may contain errors related to that system including, but not limited to, errors in grammar, punctuation, and spelling, as well as words and phrases that possibly may have been recognized inappropriately.  If there are any questions or concerns, contact the dictating provider for clarification.    The  [employer] Cures Act makes medical notes like these available to patients in the interest of transparency. Please be advised this is a medical document. Medical documents are intended to carry relevant information, facts as evident, and the clinical opinion of the practitioner. The medical note is intended as peer to peer communication and may appear blunt or direct. It is written in medical language and may contain abbreviations or verbiage that are unfamiliar.     Shantel Manzanares MD  [facility]. Tel: [phone]. Fax: [phone].     Oswaldo Piedra : 1940 MRN: [id number] CSN: [id number]          [1]   Past Medical History:   Atrial fibrillation (HCC)    Esophageal reflux    Hemorrhoids    HIGH BLOOD PRESSURE    Multiple myeloma (HCC)    RA (rheumatoid arthritis) (HCC)    Unspecified essential hypertension    Visual impairment    wears glasses for distance   [2]   Past Surgical History:  Procedure Laterality Date    Appendectomy      Colonoscopy      Colonoscopy N/A 2015    Procedure: COLONOSCOPY;  Surgeon: Srini Maria MD;   Location:  ENDOSCOPY    Knee replacement surgery      Right knee    Ligation of hemorrhoid(s)  2015    Other surgical history      ra nodule removal    Other surgical history      broken jaw repair    Other surgical history      shoulder surg    Skin surgery       Scars to mid frontal scalp and right frontal scalp s/p removal of BCC  & 2011   [3]   Social History  Socioeconomic History    Marital status: Single   Tobacco Use    Smoking status: Former     Current packs/day: 0.00     Types: Cigarettes     Quit date: 1977     Years since quittin.4    Smokeless tobacco: Former   Vaping Use    Vaping status: Never Used   Substance and Sexual Activity    Alcohol use: Yes     Comment: 14    Drug use: No   Other Topics Concern    Caffeine Concern Yes     Comment: 1 can of pop daily    Exercise No     Social Drivers of Health     Food Insecurity: No Food Insecurity (2025)    NCSS - Food Insecurity     Worried About Running Out of Food in the Last Year: No     Ran Out of Food in the Last Year: No   Transportation Needs: No Transportation Needs (2025)    NCSS - Transportation     Lack of Transportation: No   Housing Stability: Not At Risk (2025)    NCSS - Housing/Utilities     Has Housing: Yes     Worried About Losing Housing: No     Unable to Get Utilities: No   [4]   Family History  Problem Relation Age of Onset    Cancer Father         colon    Other (Other) Father     Heart Disorder Mother     Hypertension Mother     Other (Other) Mother     Heart Disorder Sister     Heart Disorder Brother         pacer   [5]   dilTIAZem ER    lisinopril    rivaroxaban    tamsulosin    cholecalciferol    vitamin E    acetaminophen    ondansetron    metoclopramide    melatonin    ceFAZolin    furosemide  [6]   Allergies  Allergen Reactions    Amlodipine      Pedal edema    Naprosyn [Naproxen] OTHER (SEE COMMENTS)     Instructed by pcp in [place], no naprosyn due to liver damage.

## 2025-06-12 NOTE — OCCUPATIONAL THERAPY NOTE
OCCUPATIONAL THERAPY EVALUATION - INPATIENT     Room Number: 425/425-A  Evaluation Date: 6/12/2025  Type of Evaluation: Initial  Presenting Problem: LLE cellulitis    Physician Order: IP Consult to Occupational Therapy  Reason for Therapy: ADL/IADL Dysfunction and Discharge Planning    OCCUPATIONAL THERAPY ASSESSMENT   Patient is currently functioning near baseline with toileting, bathing, lower body dressing, grooming, bed mobility, transfers, and functional standing tolerance. Prior to admission, patient's baseline is mod indep with upright walker, independent with ADLs but \"effortful\".  Patient is requiring minimum assistance as a result of the following impairments: decreased functional strength, decreased functional reach, decreased endurance, and medical status. Occupational Therapy will continue to follow for duration of hospitalization.    Patient will benefit from continued skilled OT Services at discharge to promote prior level of function.  Anticipate patient will return home with home health OT    History Related to Current Admission: Patient is a 84 year old male admitted on 6/11/2025 with Presenting Problem: LLE cellulitis. Co-Morbidities : atrial fibrillation, GERD, hypertension, multiple myeloma, rheumatoid arthritis    WEIGHT BEARING RESTRICTION       Recommendations for nursing staff:   Transfers: x1 with walker  Toileting location: bathroom    EVALUATION SESSION:  Patient Start of Session: bed    FUNCTIONAL TRANSFER ASSESSMENT  Sit to Stand: Edge of Bed  Edge of Bed: Contact Guard Assist  Toilet Transfer: Contact Guard Assist    BED MOBILITY  Supine to Sit : Stand-by Assist    BALANCE ASSESSMENT     FUNCTIONAL ADL ASSESSMENT  LB Dressing Seated: Maximum Assist  LB Dressing Standing: Minimal Assist  Toileting Standing: Minimal Assist    ACTIVITY TOLERANCE: fair                         O2 SATURATIONS       COGNITION  Overall Cognitive Status:  WFL - within functional limits    Upper Extremity   ROM:  within functional limits   Strength: within functional limits   Coordination  Gross motor:   Fine motor:   Sensation:     EDUCATION PROVIDED  Patient Education : Role of Occupational Therapy; Plan of Care; Discharge Recommendations; Functional Transfer Techniques; Posture/Positioning; Edema Reduction; Energy Conservation; Proper Body Mechanics  Patient's Response to Education: Verbalized Understanding    Equipment used: walker  Demonstrates functional use, Would benefit from additional trial      Therapist comments: Received pt supine in bed, SBA bed mobility, CGA sit to stand tx and bathroom distance ambulation. Impaired functional reach for LB dressing, pt may benefit from training with sock aid and reacher.     Patient End of Session: Up in chair, Needs met, Call light within reach, RN aware of session/findings, All patient questions and concerns addressed, Hospital anti-slip socks, Alarm set, Family present    OCCUPATIONAL PROFILE    HOME SITUATION  Type of Home: House  Home Layout: One level  Lives With: Other (Comment) (Sister Evangelina and brother-in-law Tres)    Toilet and Equipment: Standard height toilet  Shower/Tub and Equipment: Walk-in shower, Shower chair             Drives: Yes (unable to transfer in/out of his own car for a couple months now)  Patient Regularly Uses: Glasses (up-walker)    Prior Level of Function: mod indep mobility, independent with ADL but takes \"a lot of effort\"    SUBJECTIVE   Pleasant and cooperative     PAIN ASSESSMENT  Ratin          OBJECTIVE  Precautions: Bed/chair alarm  Fall Risk: Standard fall risk    ASSESSMENTS    AM-PAC ‘6-Clicks’ Inpatient Daily Activity Short Form  -   Putting on and taking off regular lower body clothing?: A Lot  -   Bathing (including washing, rinsing, drying)?: A Lot  -   Toileting, which includes using toilet, bedpan or urinal? : A Little  -   Putting on and taking off regular upper body clothing?: A Little  -   Taking care of personal grooming  such as brushing teeth?: None  -   Eating meals?: None    AM-PAC Score:  Score: 18  Approx Degree of Impairment: 46.65%  Standardized Score (AM-PAC Scale): 38.66    ADDITIONAL TESTS     NEUROLOGICAL FINDINGS      COGNITION ASSESSMENTS     PLAN     OT Treatment Plan: Balance activities, ADL training, Energy conservation/work simplification techniques, Functional transfer training, UE strengthening/ROM, Patient/Family training, Patient/Family education, Compensatory technique education  Rehab Potential : Fair  Frequency: 3x/week  Number of Visits to Meet Established Goals: 3    ADL Goals   Patient will perform lower body dressing:  with supervision and with adaptive equipment PRN  Patient will perform toileting: with supervision    Functional Transfer Goals  Patient will transfer to toilet:  with supervision      Patient Evaluation Complexity Level:   Occupational Profile/Medical History LOW - Brief history including review of medical or therapy records    Specific performance deficits impacting engagement in ADL/IADL LOW  1 - 3 performance deficits    Client Assessment/Performance Deficits LOW - No comorbidities nor modifications of tasks    Clinical Decision Making LOW - Analysis of occupational profile, problem-focused assessments, limited treatment options    Overall Complexity LOW     OT Session Time: 32 minutes  Self-Care Home Management: 10 minutes  Therapeutic Activity: 13 minutes  Neuromuscular Re-education:  minutes  Therapeutic Exercise:  minutes  Cognitive Skills:  minutes  Sensory Integrative:  minutes  Orthotic Management and Training:  minutes  Can add/delete any of these

## 2025-06-12 NOTE — CONSULTS
[facility]  Report of Inpatient Wound Care Consultation    Oswaldo Piedra Patient Status:  Inpatient    1940 MRN [id number]   [facility] 4NW-A Attending Markell Johnson MD   Hosp Day # 0 PCP Fortino Ortega MD     Reason for Consultation:  L foot and sacral    History of Present Illness:  Oswaldo Piedra is a a(n) 84 year old male.  Patient with multiple comorbidities.    SUBJECTIVE:  I have had this for some time.       History:  Past Medical History[1]  Past Surgical History[2]   reports that he quit smoking about 48 years ago. His smoking use included cigarettes. He has quit using smokeless tobacco. He reports current alcohol use. He reports that he does not use drugs.      Allergies:  @ALLERGY    Laboratory Data:    Recent Labs   Lab 25  1637 25  1638 25  1950 25  0607   WBC 9.4  --   --  9.2   HGB 12.6*  --   --  10.8*   HCT 38.0*  --   --  32.2*   .0  --   --  322.0   CREATSERUM  --  1.29  --  1.35*   BUN  --  19  --  23   GLU  --  150*  --  116*   CA  --  8.5*  --  8.3*   ALB  --  3.8  --   --    TP  --  6.3  --   --    PTT  --  39.8*  --   --    INR  --  1.35*  --   --    PGLU  --   --  133*  --          ASSESSMENT:  Wound 25 Sacrum Inner (Active)   Date First Assessed/Time First Assessed: 25 2100   Present on Original Admission: Yes  Primary Wound Type: Pressure Injury  Location: Sacrum  Wound Location Orientation: Inner  Wound Description (Comments): excoriation, superficial open ulcer      Assessments 2025  2:40 PM   Wound Image     Drainage Amount None   Dressing Changed Changed   Wound Length (cm) 1 cm   Wound Width (cm) 0.7 cm   Wound Surface Area (cm^2) 0.55 cm^2   Wound Depth (cm) 0 cm   Wound Volume (cm^3) 0 cm^3   Margins Well-defined edges   Nica-wound Assessment Clean;Dry   Wound Bed Granulation (%) 100 %   Wound Bed Epithelium (%) 0 %   Wound Bed Slough (%) 0 %   Wound Bed Eschar (%) 0 %   Wound Bed Fibrin  (%) 0 %   Wound Odor None   Tunneling? No   Undermining? No   Sinus Tracts? No       Wound 06/11/25 Foot Dorsal;Left (Active)   Date First Assessed/Time First Assessed: 06/11/25 2100   Present on Original Admission: Yes  Primary Wound Type: (c) Other (comment)  Location: Foot  Wound Location Orientation: Dorsal;Left  Wound Description (Comments): wound bed almost covered with ...      Assessments 6/12/2025  2:30 PM   Wound Image      Site Assessment Clean;Dry   Drainage Amount None   Dressing Changed Changed   Wound Length (cm) 2.8 cm   Wound Width (cm) 3.7 cm   Wound Surface Area (cm^2) 8.14 cm^2   Wound Depth (cm) 0 cm   Wound Volume (cm^3) 0 cm^3   Wound Bed Slough (%) 100 %   Wound Bed Eschar (%) 0 %   Wound Bed Fibrin (%) 0 %   Wound Odor None   Tunneling? No   Undermining? No   Sinus Tracts? No      R foot warm, +dorsal pedal pulse noted.   Family in room.    Wound Cleaning and Dressings:  Showering directions: May shower and/or cleanse wound with mild soap and water  Wound cleansing:  Cleanse with normal saline or wound cleanser  Wound cleaning frequency: Every 48 hours  Wound product: Honey gel and Bordered foam  Dressing change frequency:  Change dressing every other day and/or as needed  Enzymatic agent:  Honey    Care Summary:  Care Summary: Discussed Plan of Care at beside with patient. Patient verbally acknowledges understanding of all instructions and all questions were answered.      Additional Notes:  Discharge planning in progress.     Thank you for this consultation and for allowing me to participate in the care of your patient.      Time Spent 45 Minutes.    Thank you,  Nai Thomas, PT, MPT  Wound Care Clinician  [facility]  6/12/2025  2:54 PM         [1]   Past Medical History:   Atrial fibrillation (HCC)    Esophageal reflux    Hemorrhoids    HIGH BLOOD PRESSURE    Multiple myeloma (HCC)    RA (rheumatoid arthritis) (HCC)    Unspecified essential hypertension    Visual  impairment    wears glasses for distance   [2]   Past Surgical History:  Procedure Laterality Date    Appendectomy      Colonoscopy  2012    Colonoscopy N/A 6/2/2015    Procedure: COLONOSCOPY;  Surgeon: Srini Maria MD;  Location:  ENDOSCOPY    Knee replacement surgery      Right knee    Ligation of hemorrhoid(s)  7/8/2015    Other surgical history      ra nodule removal    Other surgical history      broken jaw repair    Other surgical history      shoulder surg    Skin surgery  2011     Scars to mid frontal scalp and right frontal scalp s/p removal of  & 2011

## 2025-06-12 NOTE — PHYSICAL THERAPY NOTE
PHYSICAL THERAPY EVALUATION - INPATIENT     Room Number: 425/425-A  Evaluation Date: 6/12/2025  Type of Evaluation: Initial  Physician Order: PT Eval and Treat    Presenting Problem: swelling BLE, blister on dorsum of L foot  Co-Morbidities : atrial fibrillation, GERD, hypertension, multiple myeloma, rheumatoid arthritis  Reason for Therapy: Mobility Dysfunction and Discharge Planning    IMAGING 6/11/25:  US LLE: (-) DVT    PHYSICAL THERAPY ASSESSMENT   Patient is a 84 year old male admitted 6/11/2025 for cellulitis of LLE.  Prior to admission, patient's baseline is POLLO with upwalker.  Patient is currently functioning near baseline with bed mobility, transfers, gait, maintaining seated position, and standing prolonged periods.  Patient is requiring stand-by assist and contact guard assist as a result of the following impairments: decreased functional strength, pain, impaired dynamic standing balance, decreased muscular endurance, and medical status.  Physical Therapy will continue to follow for duration of hospitalization.    Patient will benefit from continued skilled PT Services at discharge to promote prior level of function and safety with additional support and return home with home health PT.    PLAN DURING HOSPITALIZATION  Nursing Mobility Recommendation : 1 Assist  PT Device Recommendation: Other (Comment) (up-walker)  PT Treatment Plan: Body mechanics, Bed mobility, Coordination, Endurance, Energy conservation, Patient education, Gait training, Family education, Neuromuscular re-educate, Range of motion, Strengthening, Stoop training, Transfer training, Balance training  Rehab Potential : Fair  Frequency (Obs): 3-5x/week     CURRENT GOALS  Goal #1 Patient is able to demonstrate supine - sit EOB @ level: independent     Goal #2 Patient is able to demonstrate transfers Sit to/from Stand at assistance level: modified independent     Goal #3 Patient is able to ambulate 50 feet with assist device: walker -  rolling at assistance level: supervision     Goal #4 Patient is able to ambulate 150 feet with RW at supervision.   Goal #5 Patient able to ascend/descend 2 steps at supervision.   Goal #6    Goal Comments: Goals established on 2025    PHYSICAL THERAPY MEDICAL/SOCIAL HISTORY  History related to current admission: Patient is a 84 year old male admitted on 2025 from home for swelling BLE.  Pt diagnosed with LLE cellulitis.    HOME SITUATION  Type of Home: House  Home Layout: One level  Stairs to Enter : 1   Railing: No              Lives With: Other (Comment) (Sister Evangelina and brother-in-law Tres)    Drives: Yes (unable to transfer in/out of his own car for a couple months now)   Patient Regularly Uses: Glasses (up-walker)      Prior Level of [place]:   Per pt - he typically resides alone, however as of April has moved in with his sister and brother-in-law in a ranch style home.  Stays on main level of home. Ambulates with up walker at all times. Not currently driving as he reports unable to transfer in/out of his car. Had a fall last Wednesday () in the shower,did not have his lift alert on and therefore was down for a couple hours as his sister and brother in law were  not home. One other fall in last six months on  at a family party. Reports LB dressing takes \"a lot\" of effort to complete.     SUBJECTIVE  \"They tell me to have my phone nearby so I can say \"Hey randee, call [place].\"    OBJECTIVE  Precautions: Bed/chair alarm  Fall Risk: Standard fall risk    WEIGHT BEARING RESTRICTION     PAIN ASSESSMENT  Ratin  Location: pt denies pain at this time       COGNITION  Overall Cognitive Status:  WFL - within functional limits    RANGE OF MOTION AND STRENGTH ASSESSMENT  Upper extremity ROM and strength are within functional limits   Lower extremity ROM is within functional limits   Lower extremity strength is within functional limits     BALANCE  Static Sitting: Good  Dynamic Sitting: Fair  +  Static Standing: Fair  Dynamic Standing: Fair -    ADDITIONAL TESTS                                    ACTIVITY TOLERANCE                         O2 WALK       NEUROLOGICAL FINDINGS                        AM-PAC '6-Clicks' INPATIENT SHORT FORM - BASIC MOBILITY  How much difficulty does the patient currently have...  Patient Difficulty: Turning over in bed (including adjusting bedclothes, sheets and blankets)?: A Little   Patient Difficulty: Sitting down on and standing up from a chair with arms (e.g., wheelchair, bedside commode, etc.): A Little   Patient Difficulty: Moving from lying on back to sitting on the side of the bed?: A Little   How much help from another person does the patient currently need...   Help from Another: Moving to and from a bed to a chair (including a wheelchair)?: A Little   Help from Another: Need to walk in hospital room?: A Little   Help from Another: Climbing 3-5 steps with a railing?: A Little     AM-PAC Score:  Raw Score: 18   Approx Degree of Impairment: 46.58%   Standardized Score (AM-PAC Scale): 43.63   CMS Modifier (G-Code): CK    FUNCTIONAL ABILITY STATUS  Gait Assessment   Functional Mobility/Gait Assessment  Gait Assistance: Contact guard assist, Supervision  Distance (ft): 25  Assistive Device: Other (Comment) (Up-walker)  Pattern: Within Functional Limits    Skilled Therapy Provided   Educated on importance of continued out of bed mobility and ambulation with assistance  from nursing staff and use of up walker   Educated on call, don't fall policy   Encouraged up to chair 3x per day     Pt with swelling to BLE, redness to LLE observed,  wound on dorsum of left foot >  he reports occasional \"Twinges\" of pain in his feet    Bed mobility> OT assisted with donning socks> sit to stand to upwalker> ambulated around room with upwalker> upright in chair at end of session    Bed Mobility:  Rolling: NT  Supine to sit: supervision    Sit to supine: NT     Transfer Mobility:  Sit to  stand: CGA to upwalker   Stand to sit: CGA  Gait = CGA with upwalker x 25 feet decr lavon.    Therapist's Comments:   RN gave clearance to see patient. Discussed role and goal of physical therapy in hospital setting. Pt in agreement to session.     Exercise/Education Provided:  Bed mobility  Body mechanics  Energy conservation  Functional activity tolerated  Gait training  Posture  Transfer training    Patient End of Session: Up in chair, Needs met, Call light within reach, RN aware of session/findings, All patient questions and concerns addressed, Hospital anti-slip socks, Alarm set, SCDs in place, Discussed recommendations with /      Patient Evaluation Complexity Level:  History Moderate - 1 or 2 personal factors and/or co-morbidities   Examination of body systems Low -  addressing 1-2 elements   Clinical Presentation Low- Stable   Clinical Decision Making Low Complexity     PT Session Time: 30 minutes  Therapeutic Activity: 15 minutes

## 2025-06-12 NOTE — H&P
[facility]   part of [facility]    History and Physical     Oswaldo Piedra Patient Status:  Observation    1940 MRN [id number]   Location [facility] 4NW-A Attending Markell Johnson MD   Hosp Day # 0 PCP Fortino Ortega MD     Chief Complaint: Left Leg swelling and redness    History of Present Illness: Oswaldo Piedra is a 84 year old male with history of atrial fibrillation, GERD, hypertension, multiple myeloma, rheumatoid arthritis presenting with left leg swelling and erythema.  Appears the patient has had weeks of lower extremity swelling followed by recent 1 to 2 weeks of erythema which has progressively worsened.  He noticed drainage over the last week of a section of his left foot.  Due to the symptoms he was seen in urgent care who then sent him to the ER for further evaluation and treatment.  Patient does mention chills but no significant fevers.  Patient denies any other significant positive review of systems.    Past Medical History:Past Medical History[1]     Past Surgical History: Past Surgical History[2]    Social History:  reports that he quit smoking about 48 years ago. His smoking use included cigarettes. He has quit using smokeless tobacco. He reports current alcohol use. He reports that he does not use drugs.no illegal drugs, , 1 children, walker and cane, live with sister    Family History: Family History[3]  Mother passed  Father passed     Allergies: Allergies[4]    Medications:  Medications Ordered Prior to Encounter[5]    Review of Systems:   A comprehensive 14 point review of systems was completed.    Pertinent positives and negatives noted in the HPI.    Physical Exam:    /81   Pulse 95   Temp 97.5 °F (36.4 °C) (Temporal)   Resp 20   Ht 5' 9\" (1.753 m)   Wt 207 lb (93.9 kg)   SpO2 100%   BMI 30.57 kg/m²   General: No acute distress. Alert and oriented  HEENT: Normocephalic atraumatic. Moist mucous membranes. EOM-I.  Neck: No JVD. No  carotid bruits.  Respiratory: Clear to auscultation bilaterally. No wheezes. No crackles  Cardiovascular: S1, S2. Regular rate and rhythm. No murmurs  Chest and Back: No tenderness or deformity.  Abdomen: Soft, nontender, nondistended.  Positive bowel sounds. No rebound, guarding  Neurologic: No focal neurological deficits. CNII-XII grossly intact. Sensation intact, strength 5/5 UE and 4+/ 5 RLE and 4/5 LLE  Musculoskeletal: Moves all extremities.  Extremities: + edema of the LE, tenderness of the left foot  Integument: erythema of the left foot  Psychiatric: Appropriate mood and affect.      Diagnostic Data:      Labs:  Recent Labs   Lab 06/11/25  1637 06/11/25  1638   WBC 9.4  --    HGB 12.6*  --    .3*  --    .0  --    INR  --  1.35*       Recent Labs   Lab 06/11/25  1638   *   BUN 19   CREATSERUM 1.29   CA 8.5*   ALB 3.8   *   K 5.0      CO2 22.0   ALKPHO 86   AST 14   ALT 11   BILT 0.9   TP 6.3       Estimated Creatinine Clearance: 44.1 mL/min (based on SCr of 1.29 mg/dL).    Recent Labs   Lab 06/11/25  1638   PTP 16.4*   INR 1.35*       No results for input(s): \"TROP\", \"CK\" in the last 168 hours.    Imaging: Imaging data reviewed in Epic.      ASSESSMENT / PLAN:   84 year old male with history of atrial fibrillation, GERD, hypertension, multiple myeloma, rheumatoid arthritis presenting with left leg swelling and erythema.     Left LE cellulitis  -iv abx  -ID consulted  -blood cx pending    LE swelling  -etiology uncertain  -check echo to eval for HF  -iv lasix    Atrial Fibrillation  -diltiazem  -xarelto    HTN  -sbp stable  -diltiazem  -lisinopril     GERD    Quality:  DVT Prophylaxis: scd, xarelto  CODE status:   Nelson: none    Plan of care discussed with patient, family and staff    Dispo: no discharge.     Markell Johnson MD  [facility]  [phone]                           [1]   Past Medical History:   Atrial fibrillation (HCC)    Esophageal reflux    Hemorrhoids     HIGH BLOOD PRESSURE    Multiple myeloma (HCC)    RA (rheumatoid arthritis) (HCC)    Unspecified essential hypertension    Visual impairment    wears glasses for distance   [2]   Past Surgical History:  Procedure Laterality Date    Appendectomy      Colonoscopy  2012    Colonoscopy N/A 6/2/2015    Procedure: COLONOSCOPY;  Surgeon: Srini Maria MD;  Location:  ENDOSCOPY    Knee replacement surgery      Right knee    Ligation of hemorrhoid(s)  7/8/2015    Other surgical history      ra nodule removal    Other surgical history      broken jaw repair    Other surgical history      shoulder surg    Skin surgery  2011     Scars to mid frontal scalp and right frontal scalp s/p removal of  & 2011   [3]   Family History  Problem Relation Age of Onset    Cancer Father         colon    Other (Other) Father     Heart Disorder Mother     Hypertension Mother     Other (Other) Mother     Heart Disorder Sister     Heart Disorder Brother         pacer   [4]   Allergies  Allergen Reactions    Amlodipine      Pedal edema    Naprosyn [Naproxen] OTHER (SEE COMMENTS)     Instructed by pcp in [place], no naprosyn due to liver damage.   [5]   No current facility-administered medications on file prior to encounter.     Current Outpatient Medications on File Prior to Encounter   Medication Sig Dispense Refill    vitamin E 100 UNITS Oral Cap Take 1 capsule (100 Units total) by mouth in the morning.      Ascorbic Acid (VITAMIN C) 100 MG Oral Tab Take 1 tablet (100 mg total) by mouth in the morning.      METAMUCIL FIBER OR Take by mouth.      Adalimumab (HUMIRA PEN) 40 MG/0.4ML Subcutaneous Pen-injector Kit Inject 40 mg into the skin every 14 (fourteen) days. (Patient not taking: Reported on 6/11/2025) 6 each 1    dilTIAZem 360 MG Oral Capsule SR 24 Hr Take 1 capsule (360 mg total) by mouth daily. 90 capsule 3    lisinopril 2.5 MG Oral Tab Take 1 tablet (2.5 mg total) by mouth daily. 90 tablet 3    furosemide 40 MG Oral Tab Take 1  tablet (40 mg total) by mouth 2 (two) times daily. 180 tablet 3    rivaroxaban (XARELTO) 15 MG Oral Tab Take 1 tablet (15 mg total) by mouth daily with food. 90 tablet 3    tamsulosin (FLOMAX) cap TAKE 1 CAPSULE TWICE DAILY 180 capsule 3    omega-3 fatty acids 1000 MG Oral Cap Take 1,000 mg by mouth in the morning.      magnesium 250 MG Oral Tab Take 1 tablet (250 mg total) by mouth.      Vitamin D3 2000 units Oral Cap Take 1 capsule (2,000 Units total) by mouth in the morning. Every other day.  0    Naphazoline-Pheniramine 0.027-0.315 % Ophthalmic Solution Apply 1-2 drops to eye as needed.      Red Yeast Rice Extract (RED YEAST RICE OR) One capsules BID

## 2025-06-12 NOTE — PROGRESS NOTES
[facility] Hospitalist Progress Note                                                                     [facility]   part of [facility]      Oswaldo Piedra  11/22/1940    SUBJECTIVE: Denies any chest pain, palpitations, shortness of breath, cough, nausea, vomiting, abdominal pain.    OBJECTIVE:  Temp:  [97.5 °F (36.4 °C)-98.1 °F (36.7 °C)] 98.1 °F (36.7 °C)  Pulse:  [] 67  Resp:  [18-20] 18  BP: ()/(53-81) 94/53  SpO2:  [99 %-100 %] 99 %  Exam  Gen: No acute distress, alert and oriented   Pulm: Lungs clear bilaterally, normal respiratory effort, no crackles, no wheezing  CV: Heart with regular rate and rhythm, no murmur.   Abd: Abdomen soft, nontender, nondistended, bowel sounds present  MSK: + edema of the LE, no significant tenderness of the LE  Skin: no new rashes or lesions, left LE erythema improved    Labs:   Recent Labs   Lab 06/11/25  1637 06/11/25  1638 06/12/25  0607   WBC 9.4  --  9.2   HGB 12.6*  --  10.8*   .3*  --  98.5   .0  --  322.0   INR  --  1.35*  --        Recent Labs   Lab 06/11/25  1638 06/12/25  0607   * 140   K 5.0 4.3    106   CO2 22.0 25.0   BUN 19 23   CREATSERUM 1.29 1.35*   CA 8.5* 8.3*   MG  --  2.0   * 116*       Recent Labs   Lab 06/11/25  1638   ALT 11   AST 14   ALB 3.8       Recent Labs   Lab 06/11/25  1950   PGLU 133*       Meds:   Scheduled: Scheduled Medications[1]  Continuous Infusions: Medication Infusions[2]  PRN: PRN Medications[3]    ASSESSMENT / PLAN:   84 year old male with history of atrial fibrillation, GERD, hypertension, multiple myeloma, rheumatoid arthritis presenting with left leg swelling and erythema.      Left LE cellulitis  -iv abx  -ID consulted  -blood cx pending     LE swelling  -etiology uncertain  -check echo to eval for HF  -iv lasix     Atrial Fibrillation  -diltiazem  -xarelto     HTN  -sbp stable  -diltiazem  -lisinopril --> hold due to elevated  creat    Elevated Creat  -if worse tomorrow will consult renal and consider holding or decreasing diuretics   -trend     High risk med monitoring  -iv lasix     Quality:  DVT Prophylaxis: scd, xarelto  CODE status:   Nelson: none     Plan of care discussed with patient, family and staff     Dispo: no discharge.      Markell Johnson MD  [facility] Hospitalist  [phone]           [1]    dilTIAZem ER  360 mg Oral Daily    lisinopril  2.5 mg Oral Daily    rivaroxaban  15 mg Oral Daily with food    tamsulosin  0.4 mg Oral BID    cholecalciferol  2,000 Units Oral Daily    vitamin E  400 Units Oral Daily    ceFAZolin  2 g Intravenous Q8H    furosemide  40 mg Intravenous BID (Diuretic)   [2] [3]   acetaminophen    ondansetron    metoclopramide    melatonin

## 2025-06-13 LAB
ANION GAP SERPL CALC-SCNC: 10 MMOL/L (ref 0–18)
BASOPHILS # BLD AUTO: 0.02 X10(3) UL (ref 0–0.2)
BASOPHILS NFR BLD AUTO: 0.3 %
BUN BLD-MCNC: 26 MG/DL (ref 9–23)
CALCIUM BLD-MCNC: 8.6 MG/DL (ref 8.7–10.6)
CHLORIDE SERPL-SCNC: 105 MMOL/L (ref 98–112)
CO2 SERPL-SCNC: 26 MMOL/L (ref 21–32)
CREAT BLD-MCNC: 1.29 MG/DL (ref 0.7–1.3)
EGFRCR SERPLBLD CKD-EPI 2021: 55 ML/MIN/1.73M2 (ref 60–?)
EOSINOPHIL # BLD AUTO: 0.02 X10(3) UL (ref 0–0.7)
EOSINOPHIL NFR BLD AUTO: 0.3 %
ERYTHROCYTE [DISTWIDTH] IN BLOOD BY AUTOMATED COUNT: 15.6 %
GLUCOSE BLD-MCNC: 96 MG/DL (ref 70–99)
HCT VFR BLD AUTO: 32.4 % (ref 39–53)
HGB BLD-MCNC: 10.7 G/DL (ref 13–17.5)
IMM GRANULOCYTES # BLD AUTO: 0.05 X10(3) UL (ref 0–1)
IMM GRANULOCYTES NFR BLD: 0.6 %
LYMPHOCYTES # BLD AUTO: 1.21 X10(3) UL (ref 1–4)
LYMPHOCYTES NFR BLD AUTO: 15.6 %
MAGNESIUM SERPL-MCNC: 1.8 MG/DL (ref 1.6–2.6)
MCH RBC QN AUTO: 32.7 PG (ref 26–34)
MCHC RBC AUTO-ENTMCNC: 33 G/DL (ref 31–37)
MCV RBC AUTO: 99.1 FL (ref 80–100)
MONOCYTES # BLD AUTO: 0.89 X10(3) UL (ref 0.1–1)
MONOCYTES NFR BLD AUTO: 11.5 %
NEUTROPHILS # BLD AUTO: 5.55 X10 (3) UL (ref 1.5–7.7)
NEUTROPHILS # BLD AUTO: 5.55 X10(3) UL (ref 1.5–7.7)
NEUTROPHILS NFR BLD AUTO: 71.7 %
OSMOLALITY SERPL CALC.SUM OF ELEC: 297 MOSM/KG (ref 275–295)
PLATELET # BLD AUTO: 331 10(3)UL (ref 150–450)
POTASSIUM SERPL-SCNC: 3.7 MMOL/L (ref 3.5–5.1)
RBC # BLD AUTO: 3.27 X10(6)UL (ref 3.8–5.8)
SODIUM SERPL-SCNC: 141 MMOL/L (ref 136–145)
WBC # BLD AUTO: 7.7 X10(3) UL (ref 4–11)

## 2025-06-13 PROCEDURE — 83735 ASSAY OF MAGNESIUM: CPT | Performed by: HOSPITALIST

## 2025-06-13 PROCEDURE — 85025 COMPLETE CBC W/AUTO DIFF WBC: CPT | Performed by: HOSPITALIST

## 2025-06-13 PROCEDURE — 80048 BASIC METABOLIC PNL TOTAL CA: CPT | Performed by: HOSPITALIST

## 2025-06-13 RX ORDER — HYDROCODONE BITARTRATE AND ACETAMINOPHEN 5; 325 MG/1; MG/1
1 TABLET ORAL EVERY 6 HOURS PRN
Refills: 0 | Status: DISCONTINUED | OUTPATIENT
Start: 2025-06-13 | End: 2025-06-19

## 2025-06-13 RX ORDER — POTASSIUM CHLORIDE 1500 MG/1
40 TABLET, EXTENDED RELEASE ORAL ONCE
Status: COMPLETED | OUTPATIENT
Start: 2025-06-13 | End: 2025-06-13

## 2025-06-13 RX ORDER — MAGNESIUM OXIDE 400 MG/1
400 TABLET ORAL ONCE
Status: COMPLETED | OUTPATIENT
Start: 2025-06-13 | End: 2025-06-13

## 2025-06-13 RX ORDER — CEPHALEXIN 500 MG/1
500 CAPSULE ORAL 3 TIMES DAILY
Status: DISCONTINUED | OUTPATIENT
Start: 2025-06-13 | End: 2025-06-19

## 2025-06-13 NOTE — PROGRESS NOTES
[facility] Hospitalist Progress Note                                                                     [facility]   part of [facility]      Oswaldo Piedra  11/22/1940    SUBJECTIVE: Denies any chest pain, palpitations, shortness of breath, cough, nausea, vomiting, abdominal pain. Pt feels leg swelling improved but weight unchanged.     OBJECTIVE:  Temp:  [97.3 °F (36.3 °C)-98 °F (36.7 °C)] 98 °F (36.7 °C)  Pulse:  [60-80] 60  Resp:  [18] 18  BP: ()/(52-56) 105/52  SpO2:  [97 %-100 %] 99 %  Exam  Gen: No acute distress, alert and oriented   Pulm: Lungs clear bilaterally, normal respiratory effort, no crackles, no wheezing  CV: Heart with regular rate and rhythm, no murmur.   Abd: Abdomen soft, nontender, nondistended, bowel sounds present  MSK: + edema of the LE, no significant tenderness of the LE  Skin: no new rashes or lesions, left LE erythema improved    Labs:   Recent Labs   Lab 06/11/25  1637 06/11/25  1638 06/12/25  0607 06/13/25  0619   WBC 9.4  --  9.2 7.7   HGB 12.6*  --  10.8* 10.7*   .3*  --  98.5 99.1   .0  --  322.0 331.0   INR  --  1.35*  --   --        Recent Labs   Lab 06/11/25  1638 06/12/25  0607 06/13/25  0619   * 140 141   K 5.0 4.3 3.7    106 105   CO2 22.0 25.0 26.0   BUN 19 23 26*   CREATSERUM 1.29 1.35* 1.29   CA 8.5* 8.3* 8.6*   MG  --  2.0 1.8   * 116* 96       Recent Labs   Lab 06/11/25  1638   ALT 11   AST 14   ALB 3.8       Recent Labs   Lab 06/11/25  1950   PGLU 133*       Meds:   Scheduled: Scheduled Medications[1]  Continuous Infusions: Medication Infusions[2]  PRN: PRN Medications[3]    ASSESSMENT / PLAN:   84 year old male with history of atrial fibrillation, GERD, hypertension, multiple myeloma, rheumatoid arthritis presenting with left leg swelling and erythema.      Left LE cellulitis  -iv abx  -ID following   -blood cx pending--> ngtd     LE swelling  -etiology uncertain  -check  echo to eval for HF--> no  new acute pathology   -iv lasix     Atrial Fibrillation  -diltiazem  -xarelto     HTN  -sbp stable  -diltiazem  -lisinopril --> hold due to elevated creat    Elevated Creat--> improved  -if worse tomorrow will consult renal and consider holding or decreasing diuretics   -trend     High risk med monitoring  -iv lasix     Quality:  DVT Prophylaxis: scd, xarelto  CODE status:   Nelson: none     Plan of care discussed with patient, family and staff     Dispo: no discharge.      Markell Johnson MD  [facility]  [phone]           [1]    dilTIAZem ER  360 mg Oral Daily    [Held by provider] lisinopril  2.5 mg Oral Daily    rivaroxaban  15 mg Oral Daily with food    tamsulosin  0.4 mg Oral BID    cholecalciferol  2,000 Units Oral Daily    vitamin E  400 Units Oral Daily    ceFAZolin  2 g Intravenous Q8H    furosemide  40 mg Intravenous BID (Diuretic)   [2] [3]   LORazepam    acetaminophen    ondansetron    metoclopramide    melatonin

## 2025-06-13 NOTE — PROGRESS NOTES
Infectious Disease Progress Note      Date of admission: 6/11/2025  3:57 PM     Reason for consult: Left lower extremity cellulitis    Referring physician: Markell Johnson MD    Subjective: Patient cellulitis is improving.  No nausea or vomiting.  No diarrhea.    The rest of the systems were reviewed and found to be negative except was mentioned above    Interval events: This is an 84-year-old male patient admitted here with volume overload, complicated by left lower extremity cellulitis in the setting of an infected blister  Improving with IV cefazolin.    Medications:  Current Hospital Medications[1]     Allergies:  Allergies[2]    Physical Exam:  Vitals:    06/13/25 0754   BP: 107/70   Pulse: 64   Resp: 20   Temp: 97.5 °F (36.4 °C)     Vitals signs and nursing note reviewed.   Constitutional:       Appearance: Normal appearance.   HENT:      Head: Normocephalic and atraumatic.      Mouth: Mucous membranes are moist.   Neck:      Musculoskeletal: Neck supple.   Cardiovascular:      Rate and Rhythm: Normal rate.   Pulmonary:      Effort: Pulmonary effort is normal. No respiratory distress.   Musculoskeletal:      Right lower leg: +1 edema.      Left lower leg: +1 edema.  Improving cellulitis  Skin:     General: Skin is warm and dry.   Neurological:      General: No focal deficit present.      Mental Status: Alert and oriented to person, place, and time.       Laboratory data:  I have reviewed all the lab results independently.  Lab Results   Component Value Date    WBC 7.7 06/13/2025    HGB 10.7 06/13/2025    HCT 32.4 06/13/2025    .0 06/13/2025    CREATSERUM 1.29 06/13/2025    BUN 26 06/13/2025     06/13/2025    K 3.7 06/13/2025     06/13/2025    CO2 26.0 06/13/2025    GLU 96 06/13/2025    CA 8.6 06/13/2025    MG 1.8 06/13/2025      Recent Labs   Lab 06/13/25  0619   RBC 3.27*   HGB 10.7*   HCT 32.4*   MCV 99.1   MCH 32.7   MCHC 33.0   RDW 15.6   NEPRELIM 5.55   WBC 7.7   .0       Microbiology data:  Hospital Encounter on 06/11/25   1. Blood Culture     Status: None (Preliminary result)    Collection Time: 06/11/25  4:38 PM    Specimen: Blood,peripheral   Result Value Ref Range    Blood Culture Result No Growth 1 Day N/A     Impression:  Oswaldo Piedra is a 84 year old male with    Left lower extremity cellulitis in the setting of volume overload and blistering  Improving with IV cefazolin  Wound care following  Volume overload  Diuresis as per primary team    Recommendations:    Stop IV cefazolin and start cephalexin 500 mg twice daily for the next 10 days  Diuresis as per primary team  ID will sign off, please call us with any questions or changes status.  Thank you for this consultation.    The plan of care was discussed with the primary hospital team, Markell Johnson MD     Recommendations were also discussed with the patient; all questions were answered.     Thank you for this consultation. Please don't hesitate to call the ID team for questions or any acute changes in patient's clinical condition.    Please note that this report has been produced using speech recognition software and may contain errors related to that system including, but not limited to, errors in grammar, punctuation, and spelling, as well as words and phrases that possibly may have been recognized inappropriately.  If there are any questions or concerns, contact the dictating provider for clarification.    The 21st Century Cures Act makes medical notes like these available to patients in the interest of transparency. Please be advised this is a medical document. Medical documents are intended to carry relevant information, facts as evident, and the clinical opinion of the practitioner. The medical note is intended as peer to peer communication and may appear blunt or direct. It is written in medical language and may contain abbreviations or verbiage that are unfamiliar.     Shantel Manzanares MD  [facility]  Infectious Disease. Tel: [phone]. Fax: [phone].     Oswaldo Piedra : 1940 MRN: [id number] CSN: [id number]          [1]   cephalexin    LORazepam    dilTIAZem ER    [Held by provider] lisinopril    rivaroxaban    tamsulosin    cholecalciferol    vitamin E    acetaminophen    ondansetron    metoclopramide    melatonin    furosemide  [2]   Allergies  Allergen Reactions    Amlodipine      Pedal edema    Naprosyn [Naproxen] OTHER (SEE COMMENTS)     Instructed by pcp in [place], no naprosyn due to liver damage.

## 2025-06-13 NOTE — PROGRESS NOTES
Patient is A/O x4. LLE cellulitis with some improvement, less redness and swelling.  Requested lorazepam per PTA meds, MD approved and administered.  VSS and afebrile. Needs addressed, call light within reach.

## 2025-06-13 NOTE — PLAN OF CARE
Pt. A&O x4. VSS. No c/o pain. IV lasix given per orders. Antibiotics given per MAR. Fall precautions in place. Call light within reach.    Problem: SKIN/TISSUE INTEGRITY - ADULT  Goal: Skin integrity remains intact  Description: INTERVENTIONS  - Assess and document risk factors for pressure ulcer development  - Assess and document skin integrity  - Monitor for areas of redness and/or skin breakdown  - Initiate interventions, skin care algorithm/standards of care as needed  Outcome: Progressing  Goal: Incision(s), wounds(s) or drain site(s) healing without S/S of infection  Description: INTERVENTIONS:  - Assess and document risk factors for pressure ulcer development  - Assess and document skin integrity  - Assess and document dressing/incision, wound bed, drain sites and surrounding tissue  - Implement wound care per orders  - Initiate isolation precautions as appropriate  - Initiate Pressure Ulcer prevention bundle as indicated  Outcome: Progressing     Problem: METABOLIC/FLUID AND ELECTROLYTES - ADULT  Goal: Electrolytes maintained within normal limits  Description: INTERVENTIONS:  - Monitor labs and rhythm and assess patient for signs and symptoms of electrolyte imbalances  - Administer electrolyte replacement as ordered  - Monitor response to electrolyte replacements, including rhythm and repeat lab results as appropriate  - Fluid restriction as ordered  - Instruct patient on fluid and nutrition restrictions as appropriate  Outcome: Progressing

## 2025-06-14 LAB
ANION GAP SERPL CALC-SCNC: 9 MMOL/L (ref 0–18)
BASOPHILS # BLD AUTO: 0.03 X10(3) UL (ref 0–0.2)
BASOPHILS NFR BLD AUTO: 0.4 %
BUN BLD-MCNC: 27 MG/DL (ref 9–23)
CALCIUM BLD-MCNC: 9.2 MG/DL (ref 8.7–10.6)
CHLORIDE SERPL-SCNC: 102 MMOL/L (ref 98–112)
CO2 SERPL-SCNC: 27 MMOL/L (ref 21–32)
CREAT BLD-MCNC: 1.28 MG/DL (ref 0.7–1.3)
EGFRCR SERPLBLD CKD-EPI 2021: 56 ML/MIN/1.73M2 (ref 60–?)
EOSINOPHIL # BLD AUTO: 0.12 X10(3) UL (ref 0–0.7)
EOSINOPHIL NFR BLD AUTO: 1.5 %
ERYTHROCYTE [DISTWIDTH] IN BLOOD BY AUTOMATED COUNT: 15.5 %
GLUCOSE BLD-MCNC: 91 MG/DL (ref 70–99)
HCT VFR BLD AUTO: 34.3 % (ref 39–53)
HGB BLD-MCNC: 11.5 G/DL (ref 13–17.5)
IMM GRANULOCYTES # BLD AUTO: 0.05 X10(3) UL (ref 0–1)
IMM GRANULOCYTES NFR BLD: 0.6 %
LYMPHOCYTES # BLD AUTO: 1.47 X10(3) UL (ref 1–4)
LYMPHOCYTES NFR BLD AUTO: 18.1 %
MAGNESIUM SERPL-MCNC: 1.6 MG/DL (ref 1.6–2.6)
MCH RBC QN AUTO: 33.3 PG (ref 26–34)
MCHC RBC AUTO-ENTMCNC: 33.5 G/DL (ref 31–37)
MCV RBC AUTO: 99.4 FL (ref 80–100)
MONOCYTES # BLD AUTO: 1.09 X10(3) UL (ref 0.1–1)
MONOCYTES NFR BLD AUTO: 13.4 %
NEUTROPHILS # BLD AUTO: 5.35 X10 (3) UL (ref 1.5–7.7)
NEUTROPHILS # BLD AUTO: 5.35 X10(3) UL (ref 1.5–7.7)
NEUTROPHILS NFR BLD AUTO: 66 %
OSMOLALITY SERPL CALC.SUM OF ELEC: 291 MOSM/KG (ref 275–295)
PLATELET # BLD AUTO: 351 10(3)UL (ref 150–450)
POTASSIUM SERPL-SCNC: 4.1 MMOL/L (ref 3.5–5.1)
POTASSIUM SERPL-SCNC: 4.1 MMOL/L (ref 3.5–5.1)
RBC # BLD AUTO: 3.45 X10(6)UL (ref 3.8–5.8)
SODIUM SERPL-SCNC: 138 MMOL/L (ref 136–145)
WBC # BLD AUTO: 8.1 X10(3) UL (ref 4–11)

## 2025-06-14 PROCEDURE — 80048 BASIC METABOLIC PNL TOTAL CA: CPT | Performed by: HOSPITALIST

## 2025-06-14 PROCEDURE — 84132 ASSAY OF SERUM POTASSIUM: CPT | Performed by: HOSPITALIST

## 2025-06-14 PROCEDURE — 85025 COMPLETE CBC W/AUTO DIFF WBC: CPT | Performed by: HOSPITALIST

## 2025-06-14 PROCEDURE — 83735 ASSAY OF MAGNESIUM: CPT | Performed by: HOSPITALIST

## 2025-06-14 RX ORDER — BISACODYL 10 MG
10 SUPPOSITORY, RECTAL RECTAL
Status: DISCONTINUED | OUTPATIENT
Start: 2025-06-14 | End: 2025-06-19

## 2025-06-14 RX ORDER — MAGNESIUM OXIDE 400 MG/1
400 TABLET ORAL ONCE
Status: COMPLETED | OUTPATIENT
Start: 2025-06-14 | End: 2025-06-14

## 2025-06-14 NOTE — PLAN OF CARE
Patient is alert and orientated,  VSS, RA, afebrile and does not complain of pain. Patient is ambulatory with 1-2 assist with patient personal walker. Patient was given suppository and had bowel movement. Patient has adequate appetite. Wound care complete to left foot. Call light and safety precautions in place.    Problem: SKIN/TISSUE INTEGRITY - ADULT  Goal: Skin integrity remains intact  Description: INTERVENTIONS  - Assess and document risk factors for pressure ulcer development  - Assess and document skin integrity  - Monitor for areas of redness and/or skin breakdown  - Initiate interventions, skin care algorithm/standards of care as needed  6/14/2025 1843 by Dee Oden RN  Outcome: Progressing  6/14/2025 1843 by Dee Oden RN  Outcome: Progressing  Goal: Incision(s), wounds(s) or drain site(s) healing without S/S of infection  Description: INTERVENTIONS:  - Assess and document risk factors for pressure ulcer development  - Assess and document skin integrity  - Assess and document dressing/incision, wound bed, drain sites and surrounding tissue  - Implement wound care per orders  - Initiate isolation precautions as appropriate  - Initiate Pressure Ulcer prevention bundle as indicated  6/14/2025 1843 by Dee Oden RN  Outcome: Progressing  6/14/2025 1843 by Dee Oden RN  Outcome: Progressing     Problem: METABOLIC/FLUID AND ELECTROLYTES - ADULT  Goal: Electrolytes maintained within normal limits  Description: INTERVENTIONS:  - Monitor labs and rhythm and assess patient for signs and symptoms of electrolyte imbalances  - Administer electrolyte replacement as ordered  - Monitor response to electrolyte replacements, including rhythm and repeat lab results as appropriate  - Fluid restriction as ordered  - Instruct patient on fluid and nutrition restrictions as appropriate  6/14/2025 1843 by Dee Oden RN  Outcome: Progressing  6/14/2025 1843 by  Dee Oden, RN  Outcome: Progressing     Problem: SAFETY ADULT - FALL  Goal: Free from fall injury  Description: INTERVENTIONS:  - Assess pt frequently for physical needs  - Identify cognitive and physical deficits and behaviors that affect risk of falls.  - [place] fall precautions as indicated by assessment.  - Educate pt/family on patient safety including physical limitations  - Instruct pt to call for assistance with activity based on assessment  - Modify environment to reduce risk of injury  - Provide assistive devices as appropriate  - Consider OT/PT consult to assist with strengthening/mobility  - Encourage toileting schedule  Outcome: Progressing     Problem: DISCHARGE PLANNING  Goal: Discharge to home or other facility with appropriate resources  Description: INTERVENTIONS:  - Identify barriers to discharge w/pt and caregiver  - Include patient/family/discharge partner in discharge planning  - Arrange for needed discharge resources and transportation as appropriate  - Identify discharge learning needs (meds, wound care, etc)  - Arrange for interpreters to assist at discharge as needed  - Consider post-discharge preferences of patient/family/discharge partner  - Complete POLST form as appropriate  - Assess patient's ability to be responsible for managing their own health  - Refer to Case Management Department for coordinating discharge planning if the patient needs post-hospital services based on physician/LIP order or complex needs related to functional status, cognitive ability or social support system  Outcome: Progressing

## 2025-06-14 NOTE — PROGRESS NOTES
[facility] Hospitalist Progress Note                                                                     [facility]   part of [facility]      Oswaldo Piedra  11/22/1940    SUBJECTIVE: no sob/dizziness/nausea  Ambulating. Less swelling and water weight has reduced. However, L foot/leg more swollen than R traditionally.    OBJECTIVE:  Temp:  [97.7 °F (36.5 °C)-98.4 °F (36.9 °C)] 97.7 °F (36.5 °C)  Pulse:  [58-78] 76  Resp:  [16-20] 20  BP: ()/(50-57) 103/54  SpO2:  [96 %-99 %] 99 %  Exam  Gen: No acute distress, alert and oriented   Pulm: Lungs clear bilaterally, normal respiratory effort, no crackles, no wheezing  CV: Heart with regular rate and rhythm, no murmur.   Abd: Abdomen soft, nontender, nondistended, bowel sounds present  MSK: + edema of the LE, no significant tenderness of the LE  Skin: erythema LLE, most prominent on dorsum left foot- denuded blister in that area. Swelling but no underlying fluctuance.    Labs:   Recent Labs   Lab 06/11/25  1637 06/11/25  1638 06/12/25  0607 06/13/25  0619 06/14/25  0528   WBC 9.4  --  9.2 7.7 8.1   HGB 12.6*  --  10.8* 10.7* 11.5*   .3*  --  98.5 99.1 99.4   .0  --  322.0 331.0 351.0   INR  --  1.35*  --   --   --        Recent Labs   Lab 06/11/25  1638 06/12/25  0607 06/13/25  0619 06/14/25  0528   * 140 141 138   K 5.0 4.3 3.7 4.1  4.1    106 105 102   CO2 22.0 25.0 26.0 27.0   BUN 19 23 26* 27*   CREATSERUM 1.29 1.35* 1.29 1.28   CA 8.5* 8.3* 8.6* 9.2   MG  --  2.0 1.8 1.6   * 116* 96 91       Recent Labs   Lab 06/11/25  1638   ALT 11   AST 14   ALB 3.8       Recent Labs   Lab 06/11/25  1950   PGLU 133*       Meds:   Scheduled: Scheduled Medications[1]  Continuous Infusions: Medication Infusions[2]  PRN: PRN Medications[3]    ASSESSMENT / PLAN:   84 year old male with history of atrial fibrillation, GERD, hypertension, multiple myeloma, rheumatoid arthritis presenting  with left leg swelling and erythema.      Left LE cellulitis   -iv abx--> transitioned to oral abx per ID  -overall improving, continue wound care for dorsum of L foot where blister previously was     LE swelling, L >R  -echo okay  -iv lasix has been helpful, will plan for one more day as Cr has remained the same  -LLE doppler neg for DVT     Atrial Fibrillation  -diltiazem  -xarelto     HTN  -sbp stable  -diltiazem  -lisinopril --> hold due to elevated creat       Quality:  DVT Prophylaxis: scd, xarelto  CODE status:   Nelson: none     Plan of care discussed with patient, family and staff     Dispo: anticipate dc tomorrow    Ana Botello MD  [facility] Hospitalist  [phone]           [1]    cephalexin  500 mg Oral TID    dilTIAZem ER  360 mg Oral Daily    [Held by provider] lisinopril  2.5 mg Oral Daily    rivaroxaban  15 mg Oral Daily with food    tamsulosin  0.4 mg Oral BID    cholecalciferol  2,000 Units Oral Daily    vitamin E  400 Units Oral Daily    furosemide  40 mg Intravenous BID (Diuretic)   [2] [3]   HYDROcodone-acetaminophen    LORazepam    acetaminophen    ondansetron    metoclopramide    melatonin

## 2025-06-14 NOTE — PLAN OF CARE
Patient is A/O x4. C/o moderate to severe LLE throbbing pain, MD ordered Norco with relief. Continue to have LLE swelling but notable less redness. 1 person assist in transferring. VSS and afebrile. Call light within reach.     Problem: METABOLIC/FLUID AND ELECTROLYTES - ADULT  Goal: Electrolytes maintained within normal limits  Description: INTERVENTIONS:  - Monitor labs and rhythm and assess patient for signs and symptoms of electrolyte imbalances  - Administer electrolyte replacement as ordered  - Monitor response to electrolyte replacements, including rhythm and repeat lab results as appropriate  - Fluid restriction as ordered  - Instruct patient on fluid and nutrition restrictions as appropriate  Outcome: Progressing      Begin nutrition within 24-48 hrs

## 2025-06-15 LAB
ANION GAP SERPL CALC-SCNC: 7 MMOL/L (ref 0–18)
BUN BLD-MCNC: 27 MG/DL (ref 9–23)
CALCIUM BLD-MCNC: 10 MG/DL (ref 8.7–10.6)
CHLORIDE SERPL-SCNC: 101 MMOL/L (ref 98–112)
CO2 SERPL-SCNC: 29 MMOL/L (ref 21–32)
CREAT BLD-MCNC: 1.19 MG/DL (ref 0.7–1.3)
EGFRCR SERPLBLD CKD-EPI 2021: 61 ML/MIN/1.73M2 (ref 60–?)
GLUCOSE BLD-MCNC: 110 MG/DL (ref 70–99)
MAGNESIUM SERPL-MCNC: 1.6 MG/DL (ref 1.6–2.6)
OSMOLALITY SERPL CALC.SUM OF ELEC: 290 MOSM/KG (ref 275–295)
POTASSIUM SERPL-SCNC: 3.7 MMOL/L (ref 3.5–5.1)
SODIUM SERPL-SCNC: 137 MMOL/L (ref 136–145)

## 2025-06-15 PROCEDURE — 83735 ASSAY OF MAGNESIUM: CPT | Performed by: HOSPITALIST

## 2025-06-15 PROCEDURE — 80048 BASIC METABOLIC PNL TOTAL CA: CPT | Performed by: HOSPITALIST

## 2025-06-15 RX ORDER — MAGNESIUM OXIDE 400 MG/1
400 TABLET ORAL ONCE
Status: COMPLETED | OUTPATIENT
Start: 2025-06-15 | End: 2025-06-15

## 2025-06-15 RX ORDER — POTASSIUM CHLORIDE 1500 MG/1
40 TABLET, EXTENDED RELEASE ORAL ONCE
Status: COMPLETED | OUTPATIENT
Start: 2025-06-15 | End: 2025-06-15

## 2025-06-15 NOTE — PLAN OF CARE
Patient is alert and orientated, VSS, RA, afebrile and has some discomfort to left foot. Wound care complete and patient tolerated it well. Appetite is adequate. Patient ambulatory with personal, standing walker and walked the unit. Call light and safety precautions in place.    Problem: SKIN/TISSUE INTEGRITY - ADULT  Goal: Skin integrity remains intact  Description: INTERVENTIONS  - Assess and document risk factors for pressure ulcer development  - Assess and document skin integrity  - Monitor for areas of redness and/or skin breakdown  - Initiate interventions, skin care algorithm/standards of care as needed  Outcome: Progressing  Goal: Incision(s), wounds(s) or drain site(s) healing without S/S of infection  Description: INTERVENTIONS:  - Assess and document risk factors for pressure ulcer development  - Assess and document skin integrity  - Assess and document dressing/incision, wound bed, drain sites and surrounding tissue  - Implement wound care per orders  - Initiate isolation precautions as appropriate  - Initiate Pressure Ulcer prevention bundle as indicated  Outcome: Progressing     Problem: METABOLIC/FLUID AND ELECTROLYTES - ADULT  Goal: Electrolytes maintained within normal limits  Description: INTERVENTIONS:  - Monitor labs and rhythm and assess patient for signs and symptoms of electrolyte imbalances  - Administer electrolyte replacement as ordered  - Monitor response to electrolyte replacements, including rhythm and repeat lab results as appropriate  - Fluid restriction as ordered  - Instruct patient on fluid and nutrition restrictions as appropriate  Outcome: Progressing     Problem: SAFETY ADULT - FALL  Goal: Free from fall injury  Description: INTERVENTIONS:  - Assess pt frequently for physical needs  - Identify cognitive and physical deficits and behaviors that affect risk of falls.  - [place] fall precautions as indicated by assessment.  - Educate pt/family on patient safety including physical  limitations  - Instruct pt to call for assistance with activity based on assessment  - Modify environment to reduce risk of injury  - Provide assistive devices as appropriate  - Consider OT/PT consult to assist with strengthening/mobility  - Encourage toileting schedule  Outcome: Progressing     Problem: DISCHARGE PLANNING  Goal: Discharge to home or other facility with appropriate resources  Description: INTERVENTIONS:  - Identify barriers to discharge w/pt and caregiver  - Include patient/family/discharge partner in discharge planning  - Arrange for needed discharge resources and transportation as appropriate  - Identify discharge learning needs (meds, wound care, etc)  - Arrange for interpreters to assist at discharge as needed  - Consider post-discharge preferences of patient/family/discharge partner  - Complete POLST form as appropriate  - Assess patient's ability to be responsible for managing their own health  - Refer to Case Management Department for coordinating discharge planning if the patient needs post-hospital services based on physician/LIP order or complex needs related to functional status, cognitive ability or social support system  Outcome: Progressing

## 2025-06-15 NOTE — PROGRESS NOTES
[facility] Hospitalist Progress Note                                                                     [facility]   part of [facility]      Oswaldo Piedra  11/22/1940    SUBJECTIVE: feels okay. Ambulated yesterday and did feel some brief dizziness. Urinating a lot with the iv lasix.    OBJECTIVE:  Temp:  [97.7 °F (36.5 °C)-98 °F (36.7 °C)] 97.9 °F (36.6 °C)  Pulse:  [68-97] 74  Resp:  [18-20] 18  BP: (101-114)/(47-63) 114/63  SpO2:  [96 %-100 %] 100 %  Exam  Gen: No acute distress, alert and oriented   Pulm: Lungs clear bilaterally, normal respiratory effort, no crackles, no wheezing  CV: Heart with regular rate and rhythm, no murmur.   Abd: Abdomen soft, nontender, nondistended, bowel sounds present  MSK: + edema of the LE, no significant tenderness of the LE  Skin: erythema LLE, most prominent on dorsum left foot- denuded blister in that area. Swelling but no underlying fluctuance- seems to have mild improvement in swelling compared to yesterday.    Labs:   Recent Labs   Lab 06/11/25  1637 06/11/25  1638 06/12/25  0607 06/13/25  0619 06/14/25  0528   WBC 9.4  --  9.2 7.7 8.1   HGB 12.6*  --  10.8* 10.7* 11.5*   .3*  --  98.5 99.1 99.4   .0  --  322.0 331.0 351.0   INR  --  1.35*  --   --   --        Recent Labs   Lab 06/11/25  1638 06/12/25  0607 06/13/25  0619 06/14/25  0528 06/15/25  0616   * 140 141 138 137   K 5.0 4.3 3.7 4.1  4.1 3.7    106 105 102 101   CO2 22.0 25.0 26.0 27.0 29.0   BUN 19 23 26* 27* 27*   CREATSERUM 1.29 1.35* 1.29 1.28 1.19   CA 8.5* 8.3* 8.6* 9.2 10.0   MG  --  2.0 1.8 1.6 1.6   * 116* 96 91 110*       Recent Labs   Lab 06/11/25  1638   ALT 11   AST 14   ALB 3.8       Recent Labs   Lab 06/11/25  1950   PGLU 133*       Meds:   Scheduled: Scheduled Medications[1]  Continuous Infusions: Medication Infusions[2]  PRN: PRN Medications[3]    ASSESSMENT / PLAN:   84 year old male with history of atrial  fibrillation, GERD, hypertension, multiple myeloma, rheumatoid arthritis presenting with left leg swelling and erythema.      Left LE cellulitis   -iv abx--> transitioned to oral abx per ID  -overall improving, continue wound care for dorsum of L foot where blister previously was     LE swelling, L >R  -echo okay  -iv lasix has been helpful, with good urine output and stable labs. Will continue for another day  -LLE doppler neg for DVT     Atrial Fibrillation  -diltiazem  -xarelto     HTN  -sbp stable  -diltiazem  -lisinopril --> hold due to elevated creat and while diuresing       Quality:  DVT Prophylaxis: scd, xarelto  CODE status:   Nelson: none     Plan of care discussed with patient, family and staff     Dispo: we discussed possibility of dc today with oral abx and perhaps oral diuretic. However, pt's living situation at home is a bit challenging- he lives with 2 other similarly aged people who likely will not be able to provide much help. He currently is having still some substantial seeping from the wound. Would like to see if giving additional IV diuresis will help improve the drainage a bit further.    Ana Botello MD  [facility] Hospitalist  [phone]           [1]    cephalexin  500 mg Oral TID    dilTIAZem ER  360 mg Oral Daily    [Held by provider] lisinopril  2.5 mg Oral Daily    rivaroxaban  15 mg Oral Daily with food    tamsulosin  0.4 mg Oral BID    cholecalciferol  2,000 Units Oral Daily    vitamin E  400 Units Oral Daily    furosemide  40 mg Intravenous BID (Diuretic)   [2] [3]   bisacodyl    HYDROcodone-acetaminophen    LORazepam    acetaminophen    ondansetron    metoclopramide    melatonin

## 2025-06-15 NOTE — PLAN OF CARE
Patient is alert and oriented. Afebrile. No shortness of breath noted, on room air. Complains of tolerable mild pain to left foot. Scheduled meds given per MAR tolerated. Call light within reach. Safety precautions in place. All needs attended. Continue monitor.

## 2025-06-16 LAB
ANION GAP SERPL CALC-SCNC: 9 MMOL/L (ref 0–18)
BUN BLD-MCNC: 29 MG/DL (ref 9–23)
CALCIUM BLD-MCNC: 10.3 MG/DL (ref 8.7–10.6)
CHLORIDE SERPL-SCNC: 99 MMOL/L (ref 98–112)
CO2 SERPL-SCNC: 29 MMOL/L (ref 21–32)
CREAT BLD-MCNC: 1.21 MG/DL (ref 0.7–1.3)
EGFRCR SERPLBLD CKD-EPI 2021: 60 ML/MIN/1.73M2 (ref 60–?)
GLUCOSE BLD-MCNC: 106 MG/DL (ref 70–99)
MAGNESIUM SERPL-MCNC: 1.7 MG/DL (ref 1.6–2.6)
OSMOLALITY SERPL CALC.SUM OF ELEC: 290 MOSM/KG (ref 275–295)
POTASSIUM SERPL-SCNC: 3.9 MMOL/L (ref 3.5–5.1)
POTASSIUM SERPL-SCNC: 3.9 MMOL/L (ref 3.5–5.1)
SODIUM SERPL-SCNC: 137 MMOL/L (ref 136–145)

## 2025-06-16 PROCEDURE — 83735 ASSAY OF MAGNESIUM: CPT | Performed by: HOSPITALIST

## 2025-06-16 PROCEDURE — 84132 ASSAY OF SERUM POTASSIUM: CPT | Performed by: HOSPITALIST

## 2025-06-16 PROCEDURE — 80048 BASIC METABOLIC PNL TOTAL CA: CPT | Performed by: HOSPITALIST

## 2025-06-16 RX ORDER — FUROSEMIDE 10 MG/ML
40 INJECTION INTRAMUSCULAR; INTRAVENOUS DAILY
Status: DISCONTINUED | OUTPATIENT
Start: 2025-06-17 | End: 2025-06-16

## 2025-06-16 RX ORDER — MAGNESIUM OXIDE 400 MG/1
400 TABLET ORAL ONCE
Status: COMPLETED | OUTPATIENT
Start: 2025-06-16 | End: 2025-06-16

## 2025-06-16 RX ORDER — SODIUM HYPOCHLORITE 2.5 MG/ML
SOLUTION TOPICAL AS NEEDED
Status: DISCONTINUED | OUTPATIENT
Start: 2025-06-16 | End: 2025-06-19

## 2025-06-16 RX ORDER — FUROSEMIDE 40 MG/1
40 TABLET ORAL DAILY
Status: DISCONTINUED | OUTPATIENT
Start: 2025-06-17 | End: 2025-06-19

## 2025-06-16 RX ORDER — CALCIUM CARBONATE 500 MG/1
1000 TABLET, CHEWABLE ORAL EVERY 6 HOURS PRN
Status: DISCONTINUED | OUTPATIENT
Start: 2025-06-16 | End: 2025-06-19

## 2025-06-16 NOTE — CONSULTS
Oswaldo Piedra 84 year old male with significant past medical history listed below presents with increased swelling, redness and a wound to his left foot.  Patient states that the wound has been present for about 2 weeks.  He has noticed some increased drainage from the wound.  Patient currently denies any shortness of breath, chest pain, nausea fever vomiting chills.    Past Medical History[1]  Past Surgical History[2]  Family History[3]  Social History     Socioeconomic History    Marital status: Single     Spouse name: Not on file    Number of children: Not on file    Years of education: Not on file    Highest education level: Not on file   Occupational History    Not on file   Tobacco Use    Smoking status: Former     Current packs/day: 0.00     Types: Cigarettes     Quit date: 1977     Years since quittin.4    Smokeless tobacco: Former   Vaping Use    Vaping status: Never Used   Substance and Sexual Activity    Alcohol use: Yes     Comment: 14    Drug use: No    Sexual activity: Not on file   Other Topics Concern     Service Not Asked    Blood Transfusions Not Asked    Caffeine Concern Yes     Comment: 1 can of pop daily    Occupational Exposure Not Asked    Hobby Hazards Not Asked    Sleep Concern Not Asked    Stress Concern Not Asked    Weight Concern Not Asked    Special Diet Not Asked    Back Care Not Asked    Exercise No    Bike Helmet Not Asked    Seat Belt Not Asked    Self-Exams Not Asked   Social History Narrative    Not on file     Social Drivers of Health     Food Insecurity: No Food Insecurity (2025)    NCSS - Food Insecurity     Worried About Running Out of Food in the Last Year: No     Ran Out of Food in the Last Year: No   Transportation Needs: No Transportation Needs (2025)    NCSS - Transportation     Lack of Transportation: No   Housing Stability: Not At Risk (2025)    NCSS - Housing/Utilities     Has Housing: Yes     Worried About Losing Housing: No      Unable to Get Utilities: No     Allergies[4]    ROS  Constitutional: negative for - fever, chills, weight change  Integument: Left foot ulcer  Respiratory: negative for - wheezing, SOB, chest congestion, cough  Cardiovascular: negative for - (+) lower extremity edema, cyanosis, palpitations, chest pain, night cramps  Gastrointestinal: negative for - abdominal pain, diarrhea, heartburn, nausea  Musculoskeletal: negative for -  ankle pain, joint pain, muscle cramps, muscle weakness  Neurological: negative for - tremors, memory loss, paralysis, numbness    Physical exam:   Vitals:    06/16/25 1638   BP: 95/54   Pulse: 83   Resp: 16   Temp: 97.8 °F (36.6 °C)     General: NAD  HEENT: EOMI, PERRLA  Respiratory: No wheezing, no rhonchi  Musculoskeletal: No pain  Integument: Left foot ulcer, erythema  Neuro: No focal deficits  Psych: Mood and affect normal    Lower extremity exam:  DP/PT palpable bilaterally. CFT<3secs all digits. Sensation intact.  Fibronecrotic ulcer noted to dorsal left midfoot with mild drainage, erythema, edema    Last A1c value was 5.6% done 4/24/2018.    Lab Results   Component Value Date    CREATSERUM 1.21 06/16/2025    BUN 29 06/16/2025     06/16/2025    K 3.9 06/16/2025    K 3.9 06/16/2025    CL 99 06/16/2025    CO2 29.0 06/16/2025     06/16/2025    CA 10.3 06/16/2025    MG 1.7 06/16/2025       Hospital Encounter on 06/11/25   1. Blood Culture     Status: None (Preliminary result)    Collection Time: 06/11/25  4:38 PM    Specimen: Blood,peripheral   Result Value Ref Range    Blood Culture Result No Growth 4 Days N/A       US VENOUS DOPPLER LEG LEFT - DIAG IMG (CPT=93971)  Result Date: 6/11/2025  CONCLUSION:  1. No sonographic evidence for deep venous thrombosis involving the left lower extremity.   LOCATION:  [facility]    Dictated by (CST): Inga Loyd MD on 6/11/2025 at 5:12 PM     Finalized by (CST): Inga Loyd MD on 6/11/2025 at 5:13 PM       Assessment & Plan: 84 year old male with      Chronic nonhealing ulcer of left foot  Left lower extremity cellulitis    Afebrile, no leukocytosis    Chronic left foot ulcer with fibronecrotic base and some mild drainage  Mild erythema resolving  + Edema    Dakin's solution soaked dressings of the ulcer  Left foot MRI ordered to evaluate for any deep abscess.  Will consider debridement of the wound pending MRI findings.    Antibiotics per ID.  Elevate left lower extremity  Weightbearing as tolerated    Thank you for the consult    Will follow    Alfonso Victoria D.P.M.         [1]   Past Medical History:   Atrial fibrillation (HCC)    Esophageal reflux    Hemorrhoids    HIGH BLOOD PRESSURE    Multiple myeloma (HCC)    RA (rheumatoid arthritis) (HCC)    Unspecified essential hypertension    Visual impairment    wears glasses for distance   [2]   Past Surgical History:  Procedure Laterality Date    Appendectomy      Colonoscopy  2012    Colonoscopy N/A 6/2/2015    Procedure: COLONOSCOPY;  Surgeon: Srini Maria MD;  Location:  ENDOSCOPY    Knee replacement surgery      Right knee    Ligation of hemorrhoid(s)  7/8/2015    Other surgical history      ra nodule removal    Other surgical history      broken jaw repair    Other surgical history      shoulder surg    Skin surgery  2011     Scars to mid frontal scalp and right frontal scalp s/p removal of  & 2011   [3]   Family History  Problem Relation Age of Onset    Cancer Father         colon    Other (Other) Father     Heart Disorder Mother     Hypertension Mother     Other (Other) Mother     Heart Disorder Sister     Heart Disorder Brother         pacer   [4]   Allergies  Allergen Reactions    Amlodipine      Pedal edema    Naprosyn [Naproxen] OTHER (SEE COMMENTS)     Instructed by pcp in [place], no naprosyn due to liver damage.

## 2025-06-16 NOTE — CONSULTS
[facility]  Report of Inpatient Wound Care Consultation    Oswaldo Piedra Patient Status:  Inpatient    1940 MRN [id number]   [facility] 4NW-A Attending Markell Johnson MD   Hosp Day # 4 PCP Fortino Ortega MD     Reason for Consultation:  L dorsal foot re-evaluation    History of Present Illness:  Oswaldo Piedra is a a(n) 84 year old male.  Patient with multiple comorbidities.    SUBJECTIVE:  \"It feels more painful than the last time I saw you.\"      History:  Past Medical History[1]  Past Surgical History[2]   reports that he quit smoking about 48 years ago. His smoking use included cigarettes. He has quit using smokeless tobacco. He reports current alcohol use. He reports that he does not use drugs.      Allergies:  @ALLERGY    Laboratory Data:    Recent Labs   Lab 25  1638 25  1950 25  0607 25  0619 25  0528 06/15/25  0616 25  0622   WBC  --   --  9.2 7.7 8.1  --   --    HGB  --   --  10.8* 10.7* 11.5*  --   --    HCT  --   --  32.2* 32.4* 34.3*  --   --    PLT  --   --  322.0 331.0 351.0  --   --    CREATSERUM 1.29  --  1.35* 1.29 1.28 1.19 1.21   BUN 19  --  23 26* 27* 27* 29*   *  --  116* 96 91 110* 106*   CA 8.5*  --  8.3* 8.6* 9.2 10.0 10.3   ALB 3.8  --   --   --   --   --   --    TP 6.3  --   --   --   --   --   --    PTT 39.8*  --   --   --   --   --   --    INR 1.35*  --   --   --   --   --   --    PGLU  --  133*  --   --   --   --   --          ASSESSMENT:  Wound 25 Foot Dorsal;Left (Active)   Date First Assessed/Time First Assessed: 25 2100   Present on Original Admission: Yes  Primary Wound Type: (c) Other (comment)  Location: Foot  Wound Location Orientation: Dorsal;Left  Wound Description (Comments): wound bed almost covered with ...      Assessments 2025  1:31 PM   Wound Image     Drainage Amount Moderate   Drainage Description Serosanguineous   Dressing Changed Changed   Wound Length (cm) 4.1 cm    Wound Width (cm) 5 cm   Wound Surface Area (cm^2) 16.1 cm^2   Wound Depth (cm) 0 cm   Wound Volume (cm^3) 0 cm^3   Margins Poorly defined   Nica-wound Assessment Edema;Induration;Painful;Swelling;Red   Wound Bed Granulation (%) 0 %   Wound Bed Epithelium (%) 0 %   Wound Bed Slough (%) 100 %   Wound Bed Eschar (%) 0 %   Wound Odor Mild   Tunneling? No   Undermining? No   Sinus Tracts? No      RN here to visualize.  Based on re-evaluation, recommend the following:  Infectious Disease consult  Podiatry consult  Further imaging to examine if any deeper infection  May need I&D of the foot, pending Podiatry consult.    Explained to the RN and Attending, Dr. Johnson the above.      Wound Cleaning and Dressings:  Showering directions: May shower and/or cleanse wound with mild soap and water  Wound cleansing:  Cleanse with normal saline or wound cleanser  Wound cleaning frequency: Daily  Wound product: Honey gel, ABD pad, Kerlix, and Paper tape  Dressing change frequency:  Change dressing daily and/or PRN  Enzymatic agent:  Honey    Care Summary:  Care Summary: Discussed Plan of Care at beside with patient. Patient verbally acknowledges understanding of all instructions and all questions were answered.      Additional Notes:  RN aware of the above.       Thank you for this consultation and for allowing me to participate in the care of your patient.      Time Spent 30 Minutes.    Thank you,  Nai Thomas, PT, MPT  Wound Care Clinician  [facility]  6/16/2025  2:11 PM       [1]   Past Medical History:   Atrial fibrillation (HCC)    Esophageal reflux    Hemorrhoids    HIGH BLOOD PRESSURE    Multiple myeloma (HCC)    RA (rheumatoid arthritis) (HCC)    Unspecified essential hypertension    Visual impairment    wears glasses for distance   [2]   Past Surgical History:  Procedure Laterality Date    Appendectomy      Colonoscopy  2012    Colonoscopy N/A 6/2/2015    Procedure: COLONOSCOPY;  Surgeon: Crow  MD Srini;  Location:  ENDOSCOPY    Knee replacement surgery      Right knee    Ligation of hemorrhoid(s)  7/8/2015    Other surgical history      ra nodule removal    Other surgical history      broken jaw repair    Other surgical history      shoulder surg    Skin surgery  2011     Scars to mid frontal scalp and right frontal scalp s/p removal of  & 2011

## 2025-06-16 NOTE — PLAN OF CARE
Patient is A/O x 4. VSS. Afebrile. Room air. No complaints of pain. Ambulates with assist and a walker. Voids. Cardiac diet; tolerating well. All medications given per MAR. PIV saline locked. Safety precautions in place. Call light within reach.  1215: Patient became symptomatic hypotensive on way back from bathroom. See flowsheets. MD Notified. Patient back in bed, feeling better.  Orthostatic blood pressures ordered Q-shift. See flowsheets/progress note.  ID and Podiatry consulted per orders.    Problem: SKIN/TISSUE INTEGRITY - ADULT  Goal: Skin integrity remains intact  Description: INTERVENTIONS  - Assess and document risk factors for pressure ulcer development  - Assess and document skin integrity  - Monitor for areas of redness and/or skin breakdown  - Initiate interventions, skin care algorithm/standards of care as needed  Outcome: Progressing  Goal: Incision(s), wounds(s) or drain site(s) healing without S/S of infection  Description: INTERVENTIONS:  - Assess and document risk factors for pressure ulcer development  - Assess and document skin integrity  - Assess and document dressing/incision, wound bed, drain sites and surrounding tissue  - Implement wound care per orders  - Initiate isolation precautions as appropriate  - Initiate Pressure Ulcer prevention bundle as indicated  Outcome: Progressing     Problem: METABOLIC/FLUID AND ELECTROLYTES - ADULT  Goal: Electrolytes maintained within normal limits  Description: INTERVENTIONS:  - Monitor labs and rhythm and assess patient for signs and symptoms of electrolyte imbalances  - Administer electrolyte replacement as ordered  - Monitor response to electrolyte replacements, including rhythm and repeat lab results as appropriate  - Fluid restriction as ordered  - Instruct patient on fluid and nutrition restrictions as appropriate  Outcome: Progressing     Problem: SAFETY ADULT - FALL  Goal: Free from fall injury  Description: INTERVENTIONS:  - Assess pt  frequently for physical needs  - Identify cognitive and physical deficits and behaviors that affect risk of falls.  - [place] fall precautions as indicated by assessment.  - Educate pt/family on patient safety including physical limitations  - Instruct pt to call for assistance with activity based on assessment  - Modify environment to reduce risk of injury  - Provide assistive devices as appropriate  - Consider OT/PT consult to assist with strengthening/mobility  - Encourage toileting schedule  Outcome: Progressing     Problem: DISCHARGE PLANNING  Goal: Discharge to home or other facility with appropriate resources  Description: INTERVENTIONS:  - Identify barriers to discharge w/pt and caregiver  - Include patient/family/discharge partner in discharge planning  - Arrange for needed discharge resources and transportation as appropriate  - Identify discharge learning needs (meds, wound care, etc)  - Arrange for interpreters to assist at discharge as needed  - Consider post-discharge preferences of patient/family/discharge partner  - Complete POLST form as appropriate  - Assess patient's ability to be responsible for managing their own health  - Refer to Case Management Department for coordinating discharge planning if the patient needs post-hospital services based on physician/LIP order or complex needs related to functional status, cognitive ability or social support system  Outcome: Progressing

## 2025-06-16 NOTE — PROGRESS NOTES
06/16/25 1258 06/16/25 1300 06/16/25 1302   Vital Signs   Pulse 70 76 79   /52 98/57 (!) 64/40   MAP (mmHg) 65 65 (!) 47   BP Location Left arm Left arm Left arm   Patient Position Lying Sitting Standing

## 2025-06-16 NOTE — PROGRESS NOTES
Infectious Disease Progress Note      Date of admission: 6/11/2025  3:57 PM     Reason for consult: Left lower extremity cellulitis    Referring physician: Markell Johnson MD    Subjective: Patient is feeling well.  No leg redness at this point.  His left foot is slightly puffy with the ulcer. However, no cellulitis.    The rest of the systems were reviewed and found to be negative except was mentioned above    Interval events: This is an 84-year-old male patient admitted here with volume overload, complicated by left lower extremity cellulitis in the setting of an infected blister  Improved with IV cefazolin, transition to cephalexin on 6/13/2025  ID reconsulted as they felt that his left leg is getting worse.    Medications:  Current Hospital Medications[1]     Allergies:  Allergies[2]    Physical Exam:  Vitals:    06/16/25 1302   BP: (!) 64/40   Pulse: 79   Resp:    Temp:      Vitals signs and nursing note reviewed.   Constitutional:       Appearance: Normal appearance.   HENT:      Head: Normocephalic and atraumatic.      Mouth: Mucous membranes are moist.   Neck:      Musculoskeletal: Neck supple.   Cardiovascular:      Rate and Rhythm: Normal rate.   Pulmonary:      Effort: Pulmonary effort is normal. No respiratory distress.   Musculoskeletal:      Right lower leg: +1 edema.      Left lower leg: +1 edema.  Improving cellulitis  Skin:     General: Skin is warm and dry.   Neurological:      General: No focal deficit present.      Mental Status: Alert and oriented to person, place, and time.       Laboratory data:  I have reviewed all the lab results independently.  Lab Results   Component Value Date    CREATSERUM 1.21 06/16/2025    BUN 29 06/16/2025     06/16/2025    K 3.9 06/16/2025    K 3.9 06/16/2025    CL 99 06/16/2025    CO2 29.0 06/16/2025     06/16/2025    CA 10.3 06/16/2025    MG 1.7 06/16/2025      Recent Labs   Lab 06/14/25  0528   RBC 3.45*   HGB 11.5*   HCT 34.3*   MCV 99.4   MCH 33.3    MCHC 33.5   RDW 15.5   NEPRELIM 5.35   WBC 8.1   .0      Microbiology data:  Hospital Encounter on 06/11/25   1. Blood Culture     Status: None (Preliminary result)    Collection Time: 06/11/25  4:38 PM    Specimen: Blood,peripheral   Result Value Ref Range    Blood Culture Result No Growth 4 Days N/A     Impression:  Oswaldo Piedra is a 84 year old male with    Left lower extremity cellulitis in the setting of volume overload and blistering  Foot ulcer noted  At the moment, I do not see any signs of worsening infection.  Suspect that the worsening redness was due to stasis changes as once the patient's leg was elevated, redness improved  Agree with podiatry consult for possible debridement of the blister  Plan cephalexin  Volume overload  Diuresis as per primary team    Recommendations:    Cephalexin 500 mg 3 times daily as previously planned for 10 days  Follow-up with podiatry  Diuresis as per primary team  Further recommendations will depend on the above workup and clinical prior    The plan of care was discussed with the primary hospital team, Markell Johnson MD     Recommendations were also discussed with the patient; all questions were answered.     Thank you for this consultation. Please don't hesitate to call the ID team for questions or any acute changes in patient's clinical condition.    Please note that this report has been produced using speech recognition software and may contain errors related to that system including, but not limited to, errors in grammar, punctuation, and spelling, as well as words and phrases that possibly may have been recognized inappropriately.  If there are any questions or concerns, contact the dictating provider for clarification.    The 21st Century Cures Act makes medical notes like these available to patients in the interest of transparency. Please be advised this is a medical document. Medical documents are intended to carry relevant information, facts  as evident, and the clinical opinion of the practitioner. The medical note is intended as peer to peer communication and may appear blunt or direct. It is written in medical language and may contain abbreviations or verbiage that are unfamiliar.     Shantel Manzanares MD  [facility]. Tel: [phone]. Fax: [phone].     Oswaldo Piedra : 1940 MRN: [id number] CSN: [id number]          [1]   [START ON 2025] furosemide    bisacodyl    cephalexin    HYDROcodone-acetaminophen    LORazepam    dilTIAZem ER    [Held by provider] lisinopril    rivaroxaban    tamsulosin    cholecalciferol    vitamin E    acetaminophen    ondansetron    metoclopramide    melatonin  [2]   Allergies  Allergen Reactions    Amlodipine      Pedal edema    Naprosyn [Naproxen] OTHER (SEE COMMENTS)     Instructed by pcp in [place], no naprosyn due to liver damage.

## 2025-06-16 NOTE — PHYSICAL THERAPY NOTE
PT attempted to see pt for f/u therapy session. Per RN, pt with + orthostatic hypotension, SBP down to 64 in standing. Will hold and re-attempt pending medical stability and as schedule allows.

## 2025-06-16 NOTE — PLAN OF CARE
Pt a/o x4. VSS on RA. No c/o pain. Meds per MAR. Dressing C/D/I. Voiding using urinal.     Fall risk protocol followed, call light within reach.

## 2025-06-16 NOTE — PROGRESS NOTES
[facility] Hospitalist Progress Note                                                                     [facility]   part of [facility]      Oswaldo Piedra  11/22/1940    SUBJECTIVE: feels okay. No chest pain, palpitations, shortness of breath, cough, nausea, vomiting, abdominal pain.     OBJECTIVE:  Temp:  [97.4 °F (36.3 °C)-98.5 °F (36.9 °C)] 98 °F (36.7 °C)  Pulse:  [74-99] 87  Resp:  [18] 18  BP: (103-119)/(56-67) 103/61  SpO2:  [95 %-100 %] 97 %  Exam  Gen: No acute distress, alert and oriented   Pulm: Lungs clear bilaterally, normal respiratory effort, no crackles, no wheezing  CV: Heart with regular rate and rhythm, no murmur.   Abd: Abdomen soft, nontender, nondistended, bowel sounds present  MSK: + edema of the LE, no significant tenderness of the LE  Skin: erythema LLE --> improved, most prominent on dorsum left foot- denuded blister in that area. Appears to have more drainage then when I first saw.     Labs:   Recent Labs   Lab 06/11/25  1637 06/11/25  1638 06/12/25  0607 06/13/25  0619 06/14/25  0528   WBC 9.4  --  9.2 7.7 8.1   HGB 12.6*  --  10.8* 10.7* 11.5*   .3*  --  98.5 99.1 99.4   .0  --  322.0 331.0 351.0   INR  --  1.35*  --   --   --        Recent Labs   Lab 06/12/25  0607 06/13/25  0619 06/14/25  0528 06/15/25  0616 06/16/25  0622    141 138 137 137   K 4.3 3.7 4.1  4.1 3.7 3.9  3.9    105 102 101 99   CO2 25.0 26.0 27.0 29.0 29.0   BUN 23 26* 27* 27* 29*   CREATSERUM 1.35* 1.29 1.28 1.19 1.21   CA 8.3* 8.6* 9.2 10.0 10.3   MG 2.0 1.8 1.6 1.6 1.7   * 96 91 110* 106*       Recent Labs   Lab 06/11/25  1638   ALT 11   AST 14   ALB 3.8       Recent Labs   Lab 06/11/25  1950   PGLU 133*       Meds:   Scheduled: Scheduled Medications[1]  Continuous Infusions: Medication Infusions[2]  PRN: PRN Medications[3]    ASSESSMENT / PLAN:   84 year old male with history of atrial fibrillation, GERD, hypertension,  multiple myeloma, rheumatoid arthritis presenting with left leg swelling and erythema.      Left LE cellulitis   -iv abx--> transitioned to oral abx per ID  -overall improving, continue wound care for dorsum of L foot where blister previously was--> blistered area has more drainage -->< wound care, re consult ID and podiatry consult to evaluate for need of debridement      LE swelling, L >R  -echo okay  -iv lasix has been helpful, with good urine output and stable labs. Will continue for another day--> change to po daily, reeval in am given low BP  -LLE doppler neg for DVT     Atrial Fibrillation  -diltiazem  -xarelto     HTN  -sbp stable  -diltiazem  -lisinopril --> hold due to elevated creat and while diuresing    High risk med monitoring  -iv lasix     Quality:  DVT Prophylaxis: scd, xarelto  CODE status:   Nelson: none     Plan of care discussed with patient, family and staff     Dispo: no dc    Markell Johnson MD  [facility] Hospitalist  [phone]           [1]    cephalexin  500 mg Oral TID    dilTIAZem ER  360 mg Oral Daily    [Held by provider] lisinopril  2.5 mg Oral Daily    rivaroxaban  15 mg Oral Daily with food    tamsulosin  0.4 mg Oral BID    cholecalciferol  2,000 Units Oral Daily    vitamin E  400 Units Oral Daily    furosemide  40 mg Intravenous BID (Diuretic)   [2] [3]   bisacodyl    HYDROcodone-acetaminophen    LORazepam    acetaminophen    ondansetron    metoclopramide    melatonin

## 2025-06-17 LAB
ANION GAP SERPL CALC-SCNC: 9 MMOL/L (ref 0–18)
BASOPHILS # BLD AUTO: 0.03 X10(3) UL (ref 0–0.2)
BASOPHILS NFR BLD AUTO: 0.4 %
BUN BLD-MCNC: 35 MG/DL (ref 9–23)
CALCIUM BLD-MCNC: 10.5 MG/DL (ref 8.7–10.6)
CHLORIDE SERPL-SCNC: 99 MMOL/L (ref 98–112)
CO2 SERPL-SCNC: 29 MMOL/L (ref 21–32)
CREAT BLD-MCNC: 1.29 MG/DL (ref 0.7–1.3)
EGFRCR SERPLBLD CKD-EPI 2021: 55 ML/MIN/1.73M2 (ref 60–?)
EOSINOPHIL # BLD AUTO: 0.21 X10(3) UL (ref 0–0.7)
EOSINOPHIL NFR BLD AUTO: 2.6 %
ERYTHROCYTE [DISTWIDTH] IN BLOOD BY AUTOMATED COUNT: 15.1 %
GLUCOSE BLD-MCNC: 120 MG/DL (ref 70–99)
HCT VFR BLD AUTO: 36.2 % (ref 39–53)
HGB BLD-MCNC: 12 G/DL (ref 13–17.5)
IMM GRANULOCYTES # BLD AUTO: 0.06 X10(3) UL (ref 0–1)
IMM GRANULOCYTES NFR BLD: 0.7 %
LYMPHOCYTES # BLD AUTO: 1.47 X10(3) UL (ref 1–4)
LYMPHOCYTES NFR BLD AUTO: 17.9 %
MAGNESIUM SERPL-MCNC: 1.7 MG/DL (ref 1.6–2.6)
MCH RBC QN AUTO: 32.9 PG (ref 26–34)
MCHC RBC AUTO-ENTMCNC: 33.1 G/DL (ref 31–37)
MCV RBC AUTO: 99.2 FL (ref 80–100)
MONOCYTES # BLD AUTO: 1.25 X10(3) UL (ref 0.1–1)
MONOCYTES NFR BLD AUTO: 15.2 %
NEUTROPHILS # BLD AUTO: 5.19 X10 (3) UL (ref 1.5–7.7)
NEUTROPHILS # BLD AUTO: 5.19 X10(3) UL (ref 1.5–7.7)
NEUTROPHILS NFR BLD AUTO: 63.2 %
OSMOLALITY SERPL CALC.SUM OF ELEC: 293 MOSM/KG (ref 275–295)
PLATELET # BLD AUTO: 412 10(3)UL (ref 150–450)
POTASSIUM SERPL-SCNC: 3.9 MMOL/L (ref 3.5–5.1)
RBC # BLD AUTO: 3.65 X10(6)UL (ref 3.8–5.8)
SODIUM SERPL-SCNC: 137 MMOL/L (ref 136–145)
WBC # BLD AUTO: 8.2 X10(3) UL (ref 4–11)

## 2025-06-17 PROCEDURE — 80048 BASIC METABOLIC PNL TOTAL CA: CPT | Performed by: HOSPITALIST

## 2025-06-17 PROCEDURE — 85025 COMPLETE CBC W/AUTO DIFF WBC: CPT | Performed by: HOSPITALIST

## 2025-06-17 PROCEDURE — 93010 ELECTROCARDIOGRAM REPORT: CPT | Performed by: INTERNAL MEDICINE

## 2025-06-17 PROCEDURE — 83735 ASSAY OF MAGNESIUM: CPT | Performed by: HOSPITALIST

## 2025-06-17 PROCEDURE — 93005 ELECTROCARDIOGRAM TRACING: CPT

## 2025-06-17 RX ORDER — MAGNESIUM OXIDE 400 MG/1
400 TABLET ORAL ONCE
Status: COMPLETED | OUTPATIENT
Start: 2025-06-17 | End: 2025-06-17

## 2025-06-17 NOTE — PROGRESS NOTES
[facility]   part of [facility] Infectious Disease  Progress Note    Oswaldo Piedra Patient Status:  Inpatient    1940 MRN [id number]   Location [facility] 4NW-A Attending Markell Johnson MD   Hosp Day # 5 PCP Fortino Ortega MD     Subjective:  Patient seen and examined in bed. Feeling well today. Tolerating antibiotics. Remains afebrile. Denies any pain. Denies n/v/d. Otherwise no new complaints.     Objective:  Blood pressure 111/67, pulse 79, temperature 98.3 °F (36.8 °C), temperature source Oral, resp. rate 14, height 5' 9\" (1.753 m), weight 191 lb 11.2 oz (87 kg), SpO2 95%.    Intake/Output:    Intake/Output Summary (Last 24 hours) at 2025 0817  Last data filed at 2025 0413  Gross per 24 hour   Intake 960 ml   Output 2595 ml   Net -1635 ml       Physical Exam:  General: Awake, alert, non-tox, NAD.  HEENT:  Oropharynx clear, trachea ML.  Heart: RRR S1S2 no murmurs.  Lungs: Essentially CTA b/l, no rhonchi, rales, wheezes.  Abdomen: Soft, NT/ND.  BS present.  No guarding or rebound.  Extremity: +1 BLE edema. Cellulitis improving to LLE.   Neurological: No focal deficits.  Derm:  Warm and dry.        Lab Data Review:  Lab Results   Component Value Date    WBC 8.2 2025    HGB 12.0 2025    HCT 36.2 2025    .0 2025    CREATSERUM 1.29 2025    BUN 35 2025     2025    K 3.9 2025    CL 99 2025    CO2 29.0 2025     2025    CA 10.5 2025    MG 1.7 2025        Cultures:  Hospital Encounter on 25   1. Blood Culture     Status: None    Collection Time: 25  4:38 PM    Specimen: Blood,peripheral   Result Value Ref Range    Blood Culture Result No Growth 5 Days N/A       Radiology:  No results found.      Assessment and Plan:  1.  LLE cellulitis in the setting of volume overload and blistering  - Foot ulcer noted.  - Podiatry following -- consider debridement  pending MRI findings.  - PO Keflex, ongoing.     2.  Volume overload  - Diuresis as per the primary team.    3.  Recs  - Continue PO Keflex 500 mg, 3 times daily, as previously planned for 10 days.  - F/u WBC and fever curve.  - F/u MRI when available.  - Diuresis as per the primary team.  - Supportive care as per the primary team.  All questions addressed and understanding verbalized.  - Further recommendations pending clinical course.    Discussed case with ID attending/collaborating physician, Dr. Shantel Manzanares, who is in agreement with the above plan of care    Please note that this report has been produced using speech recognition software and may contain errors related to that system including, but not limited to, errors in grammar, punctuation, and spelling, as well as words and phrases that possibly may have been recognized inappropriately.  If there are any questions or concerns, contact the dictating provider for clarification.     The 21st Century Cures Act makes medical notes like these available to patients in the interest of transparency. Please be advised this is a medical document. Medical documents are intended to carry relevant information, facts as evident, and the clinical opinion of the practitioner. The medical note is intended as peer to peer communication and may appear blunt or direct. It is written in medical language and may contain abbreviations or verbiage that are unfamiliar.     If you have any questions or concerns please call [facility] Infectious Disease at [phone].     Fareed Puga, APRRASHMI    6/17/2025  8:17 AM

## 2025-06-17 NOTE — PROGRESS NOTES
[facility] Hospitalist Progress Note                                                                     [facility]   part of [facility]      Oswaldo Piedra  11/22/1940    SUBJECTIVE: feels okay. No chest pain, palpitations, shortness of breath, cough, nausea, vomiting, abdominal pain. Had dizziness yesterday but none today yet    OBJECTIVE:  Temp:  [97.7 °F (36.5 °C)-99.2 °F (37.3 °C)] 98.3 °F (36.8 °C)  Pulse:  [] 103  Resp:  [16-18] 17  BP: ()/(40-72) 74/59  SpO2:  [95 %-100 %] 95 %  Exam  Gen: No acute distress, alert and oriented   Pulm: Lungs clear bilaterally, normal respiratory effort, no crackles, no wheezing  CV: Heart with regular rate and rhythm, no murmur.   Abd: Abdomen soft, nontender, nondistended, bowel sounds present  MSK: + edema of the LE--> improved, no significant tenderness of the LE  Skin: erythema LLE --> improved overall, most prominent on dorsum left foot- denuded blister in that area    Labs:   Recent Labs   Lab 06/11/25  1637 06/11/25  1638 06/12/25  0607 06/13/25  0619 06/14/25  0528 06/17/25  0550   WBC 9.4  --  9.2 7.7 8.1 8.2   HGB 12.6*  --  10.8* 10.7* 11.5* 12.0*   .3*  --  98.5 99.1 99.4 99.2   .0  --  322.0 331.0 351.0 412.0   INR  --  1.35*  --   --   --   --        Recent Labs   Lab 06/13/25  0619 06/14/25  0528 06/15/25  0616 06/16/25  0622 06/17/25  0550    138 137 137 137   K 3.7 4.1  4.1 3.7 3.9  3.9 3.9    102 101 99 99   CO2 26.0 27.0 29.0 29.0 29.0   BUN 26* 27* 27* 29* 35*   CREATSERUM 1.29 1.28 1.19 1.21 1.29   CA 8.6* 9.2 10.0 10.3 10.5   MG 1.8 1.6 1.6 1.7 1.7   GLU 96 91 110* 106* 120*       Recent Labs   Lab 06/11/25  1638   ALT 11   AST 14   ALB 3.8       Recent Labs   Lab 06/11/25  1950   PGLU 133*       Meds:   Scheduled: Scheduled Medications[1]  Continuous Infusions: Medication Infusions[2]  PRN: PRN Medications[3]    ASSESSMENT / PLAN:   84 year old male with  history of atrial fibrillation, GERD, hypertension, multiple myeloma, rheumatoid arthritis presenting with left leg swelling and erythema.      Left LE cellulitis   -iv abx--> transitioned to oral abx per ID  -overall improving, continue wound care for dorsum of L foot where blister previously was--> blistered area has more drainage -->< wound care, re consult ID and podiatry consult to evaluate for need of debridement --> per ID no change in abx, per podiatry check MRI and may need debridement      LE swelling, L >R  -echo okay  -iv lasix has been helpful, with good urine output and stable labs. Will continue for another day--> change to po daily--> hold today as BP borderline and edema overall improved  -reeval in am given lower BP  -LLE doppler neg for DVT     Atrial Fibrillation  -diltiazem--> held due to borderline BP  -cardiology consulted for med management given borderline BP and meds being held, also for cardiac preop if debridement needed as above  -xarelto     HTN  -sbp stable/borderline today   -diltiazem--> held today   -lisinopril --> hold due to elevated creat and while diuresing  -was orthostatics     Quality:  DVT Prophylaxis: scd, xarelto  CODE status:   Nelson: none     Plan of care discussed with patient and staff     Dispo: no dc    Markell Johnson MD  [facility] Hospitalist  [phone]           [1]    furosemide  40 mg Oral Daily    cephalexin  500 mg Oral TID    dilTIAZem ER  360 mg Oral Daily    [Held by provider] lisinopril  2.5 mg Oral Daily    rivaroxaban  15 mg Oral Daily with food    tamsulosin  0.4 mg Oral BID    cholecalciferol  2,000 Units Oral Daily    vitamin E  400 Units Oral Daily   [2] [3]   sodium hypochlorite    calcium carbonate    bisacodyl    HYDROcodone-acetaminophen    LORazepam    acetaminophen    ondansetron    metoclopramide    melatonin

## 2025-06-17 NOTE — CONSULTS
[facility] Cardiology  Consultation Note      Oswaldo Piedra Patient Status:  Inpatient    1940 MRN [id number]   Location [facility] 4NW-A Attending Markell Johnson MD   Hosp Day # 5 PCP Fortino Ortega MD     Reason for consult: AF, hypotension    Primary cardiologist: Axel     History of Present Illness:  Oswaldo Piedra is a 84 year old male with PAF who presented to [facility] on 2025 with LE wounds, swelling.  Being treated for LLE cellulitis, wound care and possible debridement planned. AF stable, rate controlled, diltiazem held due to soft BP. He had significant LE edema and has been given IV lasix, wt 205->191 lbs. This morning was dizzy standing with + orthostatics, ->86 sit to stand. No CP. Some chronic CRONIN. No palps, syncope.     Medications:  Current Hospital Medications[1]    Past Medical History[2]    Past Surgical History[3]    Family History  family history includes Cancer in his father; Heart Disorder in his brother, mother, and sister; Hypertension in his mother; Other in his father and mother.    Social History   reports that he quit smoking about 48 years ago. His smoking use included cigarettes. He has quit using smokeless tobacco. He reports current alcohol use. He reports that he does not use drugs.     Allergies  Allergies[4]    Review of Systems:  As per HPI, otherwise 10 point ROS is negative in detail.    Physical Exam:  Blood pressure (!) 84/48, pulse 83, temperature 97.6 °F (36.4 °C), temperature source Oral, resp. rate 14, height 69\", weight 191 lb 11.2 oz (87 kg), SpO2 96%.  Temp (24hrs), Av.3 °F (36.8 °C), Min:97.6 °F (36.4 °C), Max:99.2 °F (37.3 °C)    Wt Readings from Last 3 Encounters:   25 191 lb 11.2 oz (87 kg)   12/10/24 213 lb 9.6 oz (96.9 kg)   22 217 lb (98.4 kg)       General: Awake and alert; in no acute distress  HEENT: Extraocular movements are intact; sclerae are anicteric; scalp is atraumatic  Neck:  Supple; no JVD; no carotid bruits  Cardiac: Irreg ireg, variable S1, nl S2,  no murmurs, rubs, or gallops are appreciated  Lungs: Clear to auscultation bilaterally; no accessory muscle use is noted, no wheezes, rhonci or rales  Abdomen: Soft, non-distended, non-tender; bowel sounds are normoactive  Extremities: LLE wrapped. Trace pedal edema.   Psychiatric: Normal mood and affect; answers questions appropriately  Dermatologic: No rashes; normal skin turgor    Diagnostic testing:    Labs:   Lab Results   Component Value Date    INR 1.35 (H) 06/11/2025        Lab Results   Component Value Date    WBC 8.2 06/17/2025    HGB 12.0 06/17/2025    HCT 36.2 06/17/2025    .0 06/17/2025    CREATSERUM 1.29 06/17/2025    BUN 35 06/17/2025     06/17/2025    K 3.9 06/17/2025    CL 99 06/17/2025    CO2 29.0 06/17/2025     06/17/2025    CA 10.5 06/17/2025    MG 1.7 06/17/2025       Cardiac diagnostics:    EKG 6/17/2025: pending    Echo 6/17/2025:  1. Left ventricle: The cavity size was normal. Wall thickness was normal.      Systolic function was normal. The estimated ejection fraction was 55-60%,      by visual assessment. No diagnostic evidence for regional wall motion      abnormalities. Unable to assess LV diastolic function due to heart      rhythm.   2. Right ventricle: The cavity size was increased. Systolic function was      normal. The RV pressure during systole is 44mm Hg.   3. Left atrium: The atrium was moderately dilated.   4. Mitral valve: Systolic bowing without prolapse. There was mild      regurgitation.   5. Tricuspid valve: There was moderate regurgitation.     Impression:  1. Paroxysmal Atrial Fibrillation- post op after L TKA 9/2013- no recurrence, until found incidentally on 3/28/19 visit. Rate controlled and on Xarelto.  2. LLE wound, cellulitis, h/o Left TKA 9/17/13, right 2001.   3. HTN - controlled. BP soft, with + orthostatic hypotension 6/17/2025 (104->86)  4. RA - stable  5. GERD - no  complaints  6. H/o wide complex tachycardia - suspected aberrancy. No recurrence   7. Anemia - stable  8. Renal insufficiency/proteinuria - stable. On low dose lisinopril. nephro.  9. HLD - stable (takes red yeast rice)     Recommendations:  S/p IV lasix with wt 205->191 lbs, and improvement in LE edema. But orthostatic today. Hold lasix for now. Maybe wt 195-200 lbs better.   Continue renal dosed Xarelto  Lisinopril on hold  Continue diltiazem 360mg daily, hold parameters for BP    Thank you for allowing our practice to participate in the care of your patient. Please do not hesitate to contact me if you have any questions.    Mariusz Reyna MD  Interventional Cardiology  [facility]  Office: [phone]         [1]   Current Facility-Administered Medications   Medication Dose Route Frequency    furosemide (Lasix) tab 40 mg  40 mg Oral Daily    sodium hypochlorite (Dakin's) 0.25 % external solution   Topical PRN    calcium carbonate (Tums) chewable tab 1,000 mg  1,000 mg Oral Q6H PRN    bisacodyl (Dulcolax) 10 MG rectal suppository 10 mg  10 mg Rectal Daily PRN    cephALEXin (Keflex) cap 500 mg  500 mg Oral TID    HYDROcodone-acetaminophen (Norco) 5-325 MG per tab 1 tablet  1 tablet Oral Q6H PRN    LORazepam (Ativan) tab 0.5 mg  0.5 mg Oral Nightly PRN    dilTIAZem ER (Dilacor XR) 24 hr cap 360 mg  360 mg Oral Daily    [Held by provider] lisinopril (Prinivil; Zestril) tab 2.5 mg  2.5 mg Oral Daily    rivaroxaban (Xarelto) tab 15 mg  15 mg Oral Daily with food    tamsulosin (Flomax) cap 0.4 mg  0.4 mg Oral BID    cholecalciferol (Vitamin D3) tab 2,000 Units  2,000 Units Oral Daily    vitamin E (Alpha-E) cap 400 Units  400 Units Oral Daily    acetaminophen (Tylenol Extra Strength) tab 500 mg  500 mg Oral Q4H PRN    ondansetron (Zofran) 4 MG/2ML injection 4 mg  4 mg Intravenous Q6H PRN    metoclopramide (Reglan) 5 mg/mL injection 5 mg  5 mg Intravenous Q8H PRN    melatonin tab 3 mg  3 mg Oral Nightly PRN   [2]    Past Medical History:   Atrial fibrillation (HCC)    Esophageal reflux    Hemorrhoids    HIGH BLOOD PRESSURE    Multiple myeloma (HCC)    RA (rheumatoid arthritis) (HCC)    Unspecified essential hypertension    Visual impairment    wears glasses for distance   [3]   Past Surgical History:  Procedure Laterality Date    Appendectomy      Colonoscopy  2012    Colonoscopy N/A 6/2/2015    Procedure: COLONOSCOPY;  Surgeon: Srini Maria MD;  Location:  ENDOSCOPY    Knee replacement surgery      Right knee    Ligation of hemorrhoid(s)  7/8/2015    Other surgical history      ra nodule removal    Other surgical history      broken jaw repair    Other surgical history      shoulder surg    Skin surgery  2011     Scars to mid frontal scalp and right frontal scalp s/p removal of  & 2011   [4]   Allergies  Allergen Reactions    Amlodipine      Pedal edema    Naprosyn [Naproxen] OTHER (SEE COMMENTS)     Instructed by pcp in [place], no naprosyn due to liver damage.

## 2025-06-17 NOTE — PLAN OF CARE
Received pt a/o x4. VSS. Afebrile. Reporting mild pain in Lt foot. Medication admin per MAR. Wound dressing changed. Awaiting MRI of Lt foot. Orthostatics completed. Call light within reach. Safety precautions in place.        06/17/25 0613 06/17/25 0614 06/17/25 0616   Vital Signs   /59 91/57 (!) 74/59   MAP (mmHg) 67 65 (!) 64   Patient Position Lying Sitting Standing       Problem: SKIN/TISSUE INTEGRITY - ADULT  Goal: Incision(s), wounds(s) or drain site(s) healing without S/S of infection  Description: INTERVENTIONS:  - Assess and document risk factors for pressure ulcer development  - Assess and document skin integrity  - Assess and document dressing/incision, wound bed, drain sites and surrounding tissue  - Implement wound care per orders  - Initiate isolation precautions as appropriate  - Initiate Pressure Ulcer prevention bundle as indicated  Outcome: Progressing     Problem: SAFETY ADULT - FALL  Goal: Free from fall injury  Description: INTERVENTIONS:  - Assess pt frequently for physical needs  - Identify cognitive and physical deficits and behaviors that affect risk of falls.  - [place] fall precautions as indicated by assessment.  - Educate pt/family on patient safety including physical limitations  - Instruct pt to call for assistance with activity based on assessment  - Modify environment to reduce risk of injury  - Provide assistive devices as appropriate  - Consider OT/PT consult to assist with strengthening/mobility  - Encourage toileting schedule  Outcome: Progressing

## 2025-06-17 NOTE — PLAN OF CARE
Pt a/o x4. VSS, remained afebrile. Meds given per MAR. Magnesium replaced per electrolyte protocol. Denied any pain. Orthostatic BP done this shift. Cardiology consulted. Awaiting MRI left foot. Up to bedside commode with x1 assist and walker. Fall precautions in place. Call light within reach.     Problem: SKIN/TISSUE INTEGRITY - ADULT  Goal: Skin integrity remains intact  Description: INTERVENTIONS  - Assess and document risk factors for pressure ulcer development  - Assess and document skin integrity  - Monitor for areas of redness and/or skin breakdown  - Initiate interventions, skin care algorithm/standards of care as needed  Outcome: Progressing  Goal: Incision(s), wounds(s) or drain site(s) healing without S/S of infection  Description: INTERVENTIONS:  - Assess and document risk factors for pressure ulcer development  - Assess and document skin integrity  - Assess and document dressing/incision, wound bed, drain sites and surrounding tissue  - Implement wound care per orders  - Initiate isolation precautions as appropriate  - Initiate Pressure Ulcer prevention bundle as indicated  Outcome: Progressing     Problem: METABOLIC/FLUID AND ELECTROLYTES - ADULT  Goal: Electrolytes maintained within normal limits  Description: INTERVENTIONS:  - Monitor labs and rhythm and assess patient for signs and symptoms of electrolyte imbalances  - Administer electrolyte replacement as ordered  - Monitor response to electrolyte replacements, including rhythm and repeat lab results as appropriate  - Fluid restriction as ordered  - Instruct patient on fluid and nutrition restrictions as appropriate  Outcome: Progressing     Problem: SAFETY ADULT - FALL  Goal: Free from fall injury  Description: INTERVENTIONS:  - Assess pt frequently for physical needs  - Identify cognitive and physical deficits and behaviors that affect risk of falls.  - [place] fall precautions as indicated by assessment.  - Educate pt/family on patient safety  including physical limitations  - Instruct pt to call for assistance with activity based on assessment  - Modify environment to reduce risk of injury  - Provide assistive devices as appropriate  - Consider OT/PT consult to assist with strengthening/mobility  - Encourage toileting schedule  Outcome: Progressing

## 2025-06-17 NOTE — PHYSICAL THERAPY NOTE
Attempted to see pt for physical therapy treatment session. Pt remains orthostatic- new consult to cardiology. Will hold and re-attempt pending medical stability and plan of care. RN aware and in agreement.

## 2025-06-17 NOTE — PROGRESS NOTES
Orthostatic BP    06/17/25 1246 06/17/25 1248 06/17/25 1249   Vital Signs   /62 106/63 (!) 84/48  (RN notified)   MAP (mmHg) 72 74 (!) 58  (RN notified)   BP Location Left arm Left arm Left arm   BP Method Automatic Automatic Automatic   Patient Position Lying Sitting Standing

## 2025-06-18 ENCOUNTER — APPOINTMENT (OUTPATIENT)
Dept: MRI IMAGING | Facility: HOSPITAL | Age: 85
End: 2025-06-18
Attending: PODIATRIST
Payer: MEDICARE

## 2025-06-18 LAB
ANION GAP SERPL CALC-SCNC: 10 MMOL/L (ref 0–18)
BASOPHILS # BLD AUTO: 0.04 X10(3) UL (ref 0–0.2)
BASOPHILS NFR BLD AUTO: 0.5 %
BUN BLD-MCNC: 33 MG/DL (ref 9–23)
CALCIUM BLD-MCNC: 10.3 MG/DL (ref 8.7–10.6)
CHLORIDE SERPL-SCNC: 99 MMOL/L (ref 98–112)
CO2 SERPL-SCNC: 30 MMOL/L (ref 21–32)
CREAT BLD-MCNC: 1.12 MG/DL (ref 0.7–1.3)
EGFRCR SERPLBLD CKD-EPI 2021: 66 ML/MIN/1.73M2 (ref 60–?)
EOSINOPHIL # BLD AUTO: 0.26 X10(3) UL (ref 0–0.7)
EOSINOPHIL NFR BLD AUTO: 3 %
ERYTHROCYTE [DISTWIDTH] IN BLOOD BY AUTOMATED COUNT: 15 %
GLUCOSE BLD-MCNC: 105 MG/DL (ref 70–99)
HCT VFR BLD AUTO: 37.6 % (ref 39–53)
HGB BLD-MCNC: 12.4 G/DL (ref 13–17.5)
IMM GRANULOCYTES # BLD AUTO: 0.05 X10(3) UL (ref 0–1)
IMM GRANULOCYTES NFR BLD: 0.6 %
LYMPHOCYTES # BLD AUTO: 1.43 X10(3) UL (ref 1–4)
LYMPHOCYTES NFR BLD AUTO: 16.6 %
MAGNESIUM SERPL-MCNC: 1.7 MG/DL (ref 1.6–2.6)
MCH RBC QN AUTO: 32.7 PG (ref 26–34)
MCHC RBC AUTO-ENTMCNC: 33 G/DL (ref 31–37)
MCV RBC AUTO: 99.2 FL (ref 80–100)
MONOCYTES # BLD AUTO: 1.34 X10(3) UL (ref 0.1–1)
MONOCYTES NFR BLD AUTO: 15.6 %
NEUTROPHILS # BLD AUTO: 5.47 X10 (3) UL (ref 1.5–7.7)
NEUTROPHILS # BLD AUTO: 5.47 X10(3) UL (ref 1.5–7.7)
NEUTROPHILS NFR BLD AUTO: 63.7 %
OSMOLALITY SERPL CALC.SUM OF ELEC: 296 MOSM/KG (ref 275–295)
PLATELET # BLD AUTO: 431 10(3)UL (ref 150–450)
POTASSIUM SERPL-SCNC: 3.8 MMOL/L (ref 3.5–5.1)
RBC # BLD AUTO: 3.79 X10(6)UL (ref 3.8–5.8)
SODIUM SERPL-SCNC: 139 MMOL/L (ref 136–145)
WBC # BLD AUTO: 8.6 X10(3) UL (ref 4–11)

## 2025-06-18 PROCEDURE — 99213 OFFICE O/P EST LOW 20 MIN: CPT

## 2025-06-18 PROCEDURE — 80048 BASIC METABOLIC PNL TOTAL CA: CPT | Performed by: HOSPITALIST

## 2025-06-18 PROCEDURE — A9575 INJ GADOTERATE MEGLUMI 0.1ML: HCPCS | Performed by: HOSPITALIST

## 2025-06-18 PROCEDURE — 97110 THERAPEUTIC EXERCISES: CPT

## 2025-06-18 PROCEDURE — 83735 ASSAY OF MAGNESIUM: CPT | Performed by: HOSPITALIST

## 2025-06-18 PROCEDURE — 85025 COMPLETE CBC W/AUTO DIFF WBC: CPT | Performed by: HOSPITALIST

## 2025-06-18 PROCEDURE — 73720 MRI LWR EXTREMITY W/O&W/DYE: CPT | Performed by: PODIATRIST

## 2025-06-18 PROCEDURE — 97116 GAIT TRAINING THERAPY: CPT

## 2025-06-18 PROCEDURE — 97530 THERAPEUTIC ACTIVITIES: CPT

## 2025-06-18 RX ORDER — DILTIAZEM HYDROCHLORIDE 120 MG/1
240 CAPSULE, EXTENDED RELEASE ORAL DAILY
Status: DISCONTINUED | OUTPATIENT
Start: 2025-06-19 | End: 2025-06-19

## 2025-06-18 RX ORDER — MAGNESIUM OXIDE 400 MG/1
400 TABLET ORAL ONCE
Status: COMPLETED | OUTPATIENT
Start: 2025-06-18 | End: 2025-06-18

## 2025-06-18 RX ORDER — GADOTERATE MEGLUMINE 376.9 MG/ML
20 INJECTION INTRAVENOUS
Status: COMPLETED | OUTPATIENT
Start: 2025-06-18 | End: 2025-06-18

## 2025-06-18 RX ORDER — POTASSIUM CHLORIDE 1500 MG/1
40 TABLET, EXTENDED RELEASE ORAL ONCE
Status: COMPLETED | OUTPATIENT
Start: 2025-06-18 | End: 2025-06-18

## 2025-06-18 NOTE — PLAN OF CARE
Patient is alert and orientated, VSS, RA, afebrile and does complain of pain, but has discomfort in left foot. Wound care re-assessed and wound care complete. Patient went on two walks throughout unit. Appetite is adequate. Call light and safety precautions in place.    Problem: SKIN/TISSUE INTEGRITY - ADULT  Goal: Skin integrity remains intact  Description: INTERVENTIONS  - Assess and document risk factors for pressure ulcer development  - Assess and document skin integrity  - Monitor for areas of redness and/or skin breakdown  - Initiate interventions, skin care algorithm/standards of care as needed  Outcome: Progressing  Goal: Incision(s), wounds(s) or drain site(s) healing without S/S of infection  Description: INTERVENTIONS:  - Assess and document risk factors for pressure ulcer development  - Assess and document skin integrity  - Assess and document dressing/incision, wound bed, drain sites and surrounding tissue  - Implement wound care per orders  - Initiate isolation precautions as appropriate  - Initiate Pressure Ulcer prevention bundle as indicated  Outcome: Progressing     Problem: METABOLIC/FLUID AND ELECTROLYTES - ADULT  Goal: Electrolytes maintained within normal limits  Description: INTERVENTIONS:  - Monitor labs and rhythm and assess patient for signs and symptoms of electrolyte imbalances  - Administer electrolyte replacement as ordered  - Monitor response to electrolyte replacements, including rhythm and repeat lab results as appropriate  - Fluid restriction as ordered  - Instruct patient on fluid and nutrition restrictions as appropriate  Outcome: Progressing     Problem: SAFETY ADULT - FALL  Goal: Free from fall injury  Description: INTERVENTIONS:  - Assess pt frequently for physical needs  - Identify cognitive and physical deficits and behaviors that affect risk of falls.  - [place] fall precautions as indicated by assessment.  - Educate pt/family on patient safety including physical  limitations  - Instruct pt to call for assistance with activity based on assessment  - Modify environment to reduce risk of injury  - Provide assistive devices as appropriate  - Consider OT/PT consult to assist with strengthening/mobility  - Encourage toileting schedule  Outcome: Progressing     Problem: DISCHARGE PLANNING  Goal: Discharge to home or other facility with appropriate resources  Description: INTERVENTIONS:  - Identify barriers to discharge w/pt and caregiver  - Include patient/family/discharge partner in discharge planning  - Arrange for needed discharge resources and transportation as appropriate  - Identify discharge learning needs (meds, wound care, etc)  - Arrange for interpreters to assist at discharge as needed  - Consider post-discharge preferences of patient/family/discharge partner  - Complete POLST form as appropriate  - Assess patient's ability to be responsible for managing their own health  - Refer to Case Management Department for coordinating discharge planning if the patient needs post-hospital services based on physician/LIP order or complex needs related to functional status, cognitive ability or social support system  Outcome: Progressing

## 2025-06-18 NOTE — PROGRESS NOTES
Infectious Disease Progress Note      Date of admission: 6/11/2025  3:57 PM     Reason for consult: Left lower extremity cellulitis    Referring physician: Markell Johnson MD    Subjective: Patient is feeling well.  No leg redness at this point.  His left foot is slightly puffy with the ulcer. However, no cellulitis.  No major changes over the last 24 hours    The rest of the systems were reviewed and found to be negative except was mentioned above    Interval events: This is an 84-year-old male patient admitted here with volume overload, complicated by left lower extremity cellulitis in the setting of an infected blister  Improved with IV cefazolin, transition to cephalexin on 6/13/2025  MRI of the foot is currently pending    Medications:  Current Hospital Medications[1]     Allergies:  Allergies[2]    Physical Exam:  Vitals:    06/18/25 0756   BP: 106/66   Pulse: 86   Resp: 20   Temp: 97.7 °F (36.5 °C)     Vitals signs and nursing note reviewed.   Constitutional:       Appearance: Normal appearance.   HENT:      Head: Normocephalic and atraumatic.      Mouth: Mucous membranes are moist.   Neck:      Musculoskeletal: Neck supple.   Cardiovascular:      Rate and Rhythm: Normal rate.   Pulmonary:      Effort: Pulmonary effort is normal. No respiratory distress.   Musculoskeletal:      Right lower leg: +1 edema.      Left lower leg: +1 edema.  Improving cellulitis  Skin:     General: Skin is warm and dry.   Neurological:      General: No focal deficit present.      Mental Status: Alert and oriented to person, place, and time.       Laboratory data:  I have reviewed all the lab results independently.  Lab Results   Component Value Date    WBC 8.6 06/18/2025    HGB 12.4 06/18/2025    HCT 37.6 06/18/2025    .0 06/18/2025    CREATSERUM 1.12 06/18/2025    BUN 33 06/18/2025     06/18/2025    K 3.8 06/18/2025    CL 99 06/18/2025    CO2 30.0 06/18/2025     06/18/2025    CA 10.3 06/18/2025    MG 1.7  06/18/2025      Recent Labs   Lab 06/18/25  0630   RBC 3.79*   HGB 12.4*   HCT 37.6*   MCV 99.2   MCH 32.7   MCHC 33.0   RDW 15.0   NEPRELIM 5.47   WBC 8.6   .0      Microbiology data:  Hospital Encounter on 06/11/25   1. Blood Culture     Status: None    Collection Time: 06/11/25  4:38 PM    Specimen: Blood,peripheral   Result Value Ref Range    Blood Culture Result No Growth 5 Days N/A     Impression:  Oswaldo Piedra is a 84 year old male with    Left lower extremity cellulitis in the setting of volume overload and blistering  Foot ulcer noted  No major changes noted involving the foot  MRI of the foot to rule out underlying abscess is pending  Podiatry following  Volume overload  Diuresis as per primary team    Recommendations:    Continue cephalexin for now  Follow-up on MRI  Further recommendations will depend on the above workup and clinical prior    The plan of care was discussed with the primary hospital team, Markell Johnson MD     Recommendations were also discussed with the patient; all questions were answered.     Thank you for this consultation. Please don't hesitate to call the ID team for questions or any acute changes in patient's clinical condition.    Please note that this report has been produced using speech recognition software and may contain errors related to that system including, but not limited to, errors in grammar, punctuation, and spelling, as well as words and phrases that possibly may have been recognized inappropriately.  If there are any questions or concerns, contact the dictating provider for clarification.    The 21st Century Cures Act makes medical notes like these available to patients in the interest of transparency. Please be advised this is a medical document. Medical documents are intended to carry relevant information, facts as evident, and the clinical opinion of the practitioner. The medical note is intended as peer to peer communication and may appear  blunt or direct. It is written in medical language and may contain abbreviations or verbiage that are unfamiliar.     Shantel Manzanares MD  [facility]. Tel: [phone]. Fax: [phone].     Oswaldo Piedra : 1940 MRN: [id number] CSN: [id number]          [1]   [Held by provider] furosemide    sodium hypochlorite    calcium carbonate    bisacodyl    cephalexin    HYDROcodone-acetaminophen    LORazepam    dilTIAZem ER    [Held by provider] lisinopril    rivaroxaban    tamsulosin    cholecalciferol    vitamin E    acetaminophen    ondansetron    metoclopramide    melatonin  [2]   Allergies  Allergen Reactions    Amlodipine      Pedal edema    Naprosyn [Naproxen] OTHER (SEE COMMENTS)     Instructed by pcp in [place], no naprosyn due to liver damage.

## 2025-06-18 NOTE — CONSULTS
[facility] Cardiology  Consultation Note      Oswaldo Piedra Patient Status:  Inpatient    1940 MRN [id number]   Location [facility] 4NW-A Attending Markell Johnson MD   Hosp Day # 6 PCP Fortino Ortega MD     Reason for consult: AF, hypotension    Events: Stable. Denies CP or SOB.     Primary cardiologist: Axel     History of Present Illness:  Oswaldo Piedra is a 84 year old male with PAF who presented to [facility] on 2025 with LE wounds, swelling.  Being treated for LLE cellulitis, wound care and possible debridement planned. AF stable, rate controlled, diltiazem held due to soft BP. He had significant LE edema and has been given IV lasix, wt 205->191 lbs. This morning was dizzy standing with + orthostatics, ->86 sit to stand. No CP. Some chronic CRONIN. No palps, syncope.     Medications:  Current Hospital Medications[1]    Past Medical History[2]    Past Surgical History[3]    Family History  family history includes Cancer in his father; Heart Disorder in his brother, mother, and sister; Hypertension in his mother; Other in his father and mother.    Social History   reports that he quit smoking about 48 years ago. His smoking use included cigarettes. He has quit using smokeless tobacco. He reports current alcohol use. He reports that he does not use drugs.     Allergies  Allergies[4]    Review of Systems:  As per HPI, otherwise 10 point ROS is negative in detail.    Physical Exam:  Blood pressure 118/67, pulse 79, temperature 97.8 °F (36.6 °C), temperature source Oral, resp. rate 16, height 69\", weight 183 lb 11.2 oz (83.3 kg), SpO2 99%.  Temp (24hrs), Av.7 °F (36.5 °C), Min:97.5 °F (36.4 °C), Max:98 °F (36.7 °C)    Wt Readings from Last 3 Encounters:   25 183 lb 11.2 oz (83.3 kg)   12/10/24 213 lb 9.6 oz (96.9 kg)   22 217 lb (98.4 kg)       General: Awake and alert; in no acute distress  HEENT: Extraocular movements are intact; sclerae are  anicteric; scalp is atraumatic  Neck: Supple; no JVD; no carotid bruits  Cardiac: Irreg ireg, variable S1, nl S2,  no murmurs, rubs, or gallops are appreciated  Lungs: Clear to auscultation bilaterally; no accessory muscle use is noted, no wheezes, rhonci or rales  Abdomen: Soft, non-distended, non-tender; bowel sounds are normoactive  Extremities: LLE wrapped. Trace pedal edema.   Psychiatric: Normal mood and affect; answers questions appropriately  Dermatologic: No rashes; normal skin turgor    Diagnostic testing:    Labs:   Lab Results   Component Value Date    INR 1.35 (H) 06/11/2025        Lab Results   Component Value Date    WBC 8.6 06/18/2025    HGB 12.4 06/18/2025    HCT 37.6 06/18/2025    .0 06/18/2025    CREATSERUM 1.12 06/18/2025    BUN 33 06/18/2025     06/18/2025    K 3.8 06/18/2025    CL 99 06/18/2025    CO2 30.0 06/18/2025     06/18/2025    CA 10.3 06/18/2025    MG 1.7 06/18/2025       Cardiac diagnostics:    EKG 6/17/2025: pending    Echo 6/17/2025:  1. Left ventricle: The cavity size was normal. Wall thickness was normal.      Systolic function was normal. The estimated ejection fraction was 55-60%,      by visual assessment. No diagnostic evidence for regional wall motion      abnormalities. Unable to assess LV diastolic function due to heart      rhythm.   2. Right ventricle: The cavity size was increased. Systolic function was      normal. The RV pressure during systole is 44mm Hg.   3. Left atrium: The atrium was moderately dilated.   4. Mitral valve: Systolic bowing without prolapse. There was mild      regurgitation.   5. Tricuspid valve: There was moderate regurgitation.     Impression:  1. Paroxysmal Atrial Fibrillation- post op after L TKA 9/2013- no recurrence, until found incidentally on 3/28/19 visit. Rate controlled and on Xarelto.  2. LLE wound, cellulitis, h/o Left TKA 9/17/13, right 2001.   3. HTN - controlled. BP soft, with + orthostatic hypotension 6/17/2025  (104->86)  4. RA - stable  5. GERD - no complaints  6. H/o wide complex tachycardia - suspected aberrancy. No recurrence   7. Anemia - stable  8. Renal insufficiency/proteinuria - stable. On low dose lisinopril. nephro.  9. HLD - stable (takes red yeast rice)     Recommendations:  S/p IV lasix with wt 205->191 lbs->183 lbs. Improvement in LE edema. But orthostatic hypotension now, SBP 70s with stand. Continue holding lasix. Reduced diltiazem to 240mg daily.    Continue Xarelto - currently renal dose on 15mg daily, Cr Cl borderline ~ 51. If remains > 50 consistently, would increase to 20mg daily.   Serum creatinine: 1.12 mg/dL 06/18/25 0630  Estimated creatinine clearance: 50.9 mL/min    Thank you for allowing our practice to participate in the care of your patient. Please do not hesitate to contact me if you have any questions.    Mariusz Reyna MD  Interventional Cardiology  [facility]  Office: [phone]         [1]   Current Facility-Administered Medications   Medication Dose Route Frequency    [START ON 6/19/2025] dilTIAZem ER (Dilacor XR) 24 hr cap 240 mg  240 mg Oral Daily    [Held by provider] furosemide (Lasix) tab 40 mg  40 mg Oral Daily    sodium hypochlorite (Dakin's) 0.25 % external solution   Topical PRN    calcium carbonate (Tums) chewable tab 1,000 mg  1,000 mg Oral Q6H PRN    bisacodyl (Dulcolax) 10 MG rectal suppository 10 mg  10 mg Rectal Daily PRN    cephALEXin (Keflex) cap 500 mg  500 mg Oral TID    HYDROcodone-acetaminophen (Norco) 5-325 MG per tab 1 tablet  1 tablet Oral Q6H PRN    LORazepam (Ativan) tab 0.5 mg  0.5 mg Oral Nightly PRN    [Held by provider] lisinopril (Prinivil; Zestril) tab 2.5 mg  2.5 mg Oral Daily    rivaroxaban (Xarelto) tab 15 mg  15 mg Oral Daily with food    tamsulosin (Flomax) cap 0.4 mg  0.4 mg Oral BID    cholecalciferol (Vitamin D3) tab 2,000 Units  2,000 Units Oral Daily    vitamin E (Alpha-E) cap 400 Units  400 Units Oral Daily    acetaminophen (Tylenol  Extra Strength) tab 500 mg  500 mg Oral Q4H PRN    ondansetron (Zofran) 4 MG/2ML injection 4 mg  4 mg Intravenous Q6H PRN    metoclopramide (Reglan) 5 mg/mL injection 5 mg  5 mg Intravenous Q8H PRN    melatonin tab 3 mg  3 mg Oral Nightly PRN   [2]   Past Medical History:   Atrial fibrillation (HCC)    Esophageal reflux    Hemorrhoids    HIGH BLOOD PRESSURE    Multiple myeloma (HCC)    RA (rheumatoid arthritis) (HCC)    Unspecified essential hypertension    Visual impairment    wears glasses for distance   [3]   Past Surgical History:  Procedure Laterality Date    Appendectomy      Colonoscopy  2012    Colonoscopy N/A 6/2/2015    Procedure: COLONOSCOPY;  Surgeon: Srini Maria MD;  Location:  ENDOSCOPY    Knee replacement surgery      Right knee    Ligation of hemorrhoid(s)  7/8/2015    Other surgical history      ra nodule removal    Other surgical history      broken jaw repair    Other surgical history      shoulder surg    Skin surgery  2011     Scars to mid frontal scalp and right frontal scalp s/p removal of  & 2011   [4]   Allergies  Allergen Reactions    Amlodipine      Pedal edema    Naprosyn [Naproxen] OTHER (SEE COMMENTS)     Instructed by pcp in [place], no naprosyn due to liver damage.

## 2025-06-18 NOTE — PHYSICAL THERAPY NOTE
PHYSICAL THERAPY TREATMENT NOTE - INPATIENT    Room Number: 425/425-A     Session: 1     Number of Visits to Meet Established Goals: 5    Presenting Problem: swelling BLE, blister on dorsum of L foot  Co-Morbidities : atrial fibrillation, GERD, hypertension, multiple myeloma, rheumatoid arthritis    PHYSICAL THERAPY ASSESSMENT   Patient demonstrates fair progress this session, goals  progressing with 2/4 met this session.      Patient is requiring contact guard assist as a result of the following impairments: decreased functional strength, decreased endurance/aerobic capacity, impaired dynamic standing balance, impaired motor planning, and medical status.     Patient continues to function near baseline with bed mobility and transfers.  Next session anticipate patient to progress gait.  Physical Therapy will continue to follow patient for duration of hospitalization.    Patient continues to benefit from continued skilled PT services: at discharge to promote prior level of function and safety with additional support and return home with home health PT.    PLAN DURING HOSPITALIZATION  Nursing Mobility Recommendation : 1 Assist  PT Device Recommendation: Other (Comment) (up-walker)  PT Treatment Plan: Body mechanics, Bed mobility, Coordination, Endurance, Energy conservation, Patient education, Gait training, Family education, Neuromuscular re-educate, Range of motion, Strengthening, Stoop training, Transfer training, Balance training  Frequency (Obs): 3-5x/week     CURRENT GOALS     Goal #1 Patient is able to demonstrate supine - sit EOB @ level: independent  MET     Goal #2 Patient is able to demonstrate transfers Sit to/from Stand at assistance level: modified independent  Met   Goal #3 Patient is able to ambulate 50 feet with assist device: walker - rolling at assistance level: supervision       Goal #4 Patient is able to ambulate 150 feet with RW at supervision.   Goal #5 Patient able to ascend/descend 2 steps at  supervision.   Goal #6    Goal Comments: Goals established on 2025 all goals ongoing.    SUBJECTIVE  \" I am doing better.\"    OBJECTIVE  Precautions: Bed/chair alarm    WEIGHT BEARING RESTRICTION     PAIN ASSESSMENT   Ratin  Location: pt denies pain at this time       BALANCE                                                                                                                       Static Sitting: Good  Dynamic Sitting: Fair +           Static Standing: Fair  Dynamic Standing: Fair -    ACTIVITY TOLERANCE            BP in sitting 103/49  Standing 70/49  After gait 113/63  No dizziness             O2 WALK       AM-PAC '6-Clicks' INPATIENT SHORT FORM - BASIC MOBILITY  How much difficulty does the patient currently have...  Patient Difficulty: Turning over in bed (including adjusting bedclothes, sheets and blankets)?: None   Patient Difficulty: Sitting down on and standing up from a chair with arms (e.g., wheelchair, bedside commode, etc.): None   Patient Difficulty: Moving from lying on back to sitting on the side of the bed?: None   How much help from another person does the patient currently need...   Help from Another: Moving to and from a bed to a chair (including a wheelchair)?: A Little   Help from Another: Need to walk in hospital room?: A Little   Help from Another: Climbing 3-5 steps with a railing?: A Little     AM-PAC Score:  Raw Score: 21   Approx Degree of Impairment: 28.97%   Standardized Score (AM-PAC Scale): 50.25   CMS Modifier (G-Code): CJ    FUNCTIONAL ABILITY STATUS  Gait Assessment   Functional Mobility/Gait Assessment  Gait Assistance: Supervision  Distance (ft): 200  Assistive Device:  (upright walker)  Pattern: Within Functional Limits    Skilled Therapy Provided  Education provided on  Benefits of upright position  Promotion of walking with nursing staff  Exercises   Body mechanics     Bed Mobility:  Rolling: mod ind   Supine<>Sit: mod ind   Sit<>Supine: mod  ind     Transfer Mobility:  Sit<>Stand: SBA   Stand<>Sit: SBA   Gait: RW and cga to initiate but progressed to SBA.     Therapist's Comments: RN approved session. Patient performed exercises in three positions. Wound care team okay with pt to amb. Patient denied dizziness. BP monitored three times.   BP dropped in standing but pt didn't report dizziness, with extra safety and caution of chair follow pt able to ambulate.  Chair rest not needed.       THERAPEUTIC EXERCISES  Lower Extremity Alternating marching  Ankle pumps  Hip AB/AD  Heel raises  Hip Abd  SLR with assist  SeatedLAQ  Standing marching for one min       Upper Extremity OH Reaching     Position Sitting & Standing  supine     Repetitions   10   Sets   2     Patient End of Session: Up in chair, Needs met, Call light within reach, RN aware of session/findings, All patient questions and concerns addressed, Alarm set, Hospital anti-slip socks    PT Session Time: 38 minutes  Gait Trainin minutes  Therapeutic Activity: 10 minutes  Therapeutic Exercise: 15 minutes   Neuromuscular Re-education: 0 minutes

## 2025-06-18 NOTE — WOUND PROGRESS NOTE
[facility]  Report of Inpatient Wound Care Follow Up    Oswaldo Piedra Patient Status:  Inpatient    1940 MRN [id number]   [facility] 4NW-A Attending Markell Johnson MD   Hosp Day # 6 PCP Fortino Ortega MD     History of Present Illness:  Oswaldo Piedra is a a(n) 84 year old male.  Patient with multiple comorbidities.    SUBJECTIVE:  \"It feels better, less painful than yesterday.\"       History:  Past Medical History[1]  Past Surgical History[2]   reports that he quit smoking about 48 years ago. His smoking use included cigarettes. He has quit using smokeless tobacco. He reports current alcohol use. He reports that he does not use drugs.      Allergies:  @ALLERGY    Laboratory Data:    Recent Labs   Lab 25  1638 25  1950 25  0607 25  0528 06/15/25  0616 25  0622 25  0550 25  0630   WBC  --   --    < > 8.1  --   --  8.2 8.6   HGB  --   --    < > 11.5*  --   --  12.0* 12.4*   HCT  --   --    < > 34.3*  --   --  36.2* 37.6*   PLT  --   --    < > 351.0  --   --  412.0 431.0   CREATSERUM 1.29  --    < > 1.28   < > 1.21 1.29 1.12   BUN 19  --    < > 27*   < > 29* 35* 33*   *  --    < > 91   < > 106* 120* 105*   CA 8.5*  --    < > 9.2   < > 10.3 10.5 10.3   ALB 3.8  --   --   --   --   --   --   --    TP 6.3  --   --   --   --   --   --   --    PTT 39.8*  --   --   --   --   --   --   --    INR 1.35*  --   --   --   --   --   --   --    PGLU  --  133*  --   --   --   --   --   --     < > = values in this interval not displayed.         ASSESSMENT:  Wound 25 Foot Dorsal;Left (Active)   Date First Assessed/Time First Assessed: 25 2100   Present on Original Admission: Yes  Primary Wound Type: (c) Other (comment)  Location: Foot  Wound Location Orientation: Dorsal;Left  Wound Description (Comments): wound bed almost covered with ...      Assessments 2025  1:19 PM   Wound Image      Site Assessment Edema   Drainage Amount  Scant   Drainage Description Serosanguineous   Dressing Changed Changed   Margins Well-defined edges   Nica-wound Assessment Edema;Red;Swelling   Wound Bed Slough (%) 100 %   Wound Odor None   Tunneling? No   Undermining? No   Sinus Tracts? No                Patient seen for follow up, Podiatry and Infectious Disease is now following the patient.    MRI completed, results pending, awaiting final results of culture.    Patient still has edema and redness which has not resolved.     Edema measurements of the (L) lower leg:    At Midfoot region:  28.1cm   15 cm from the heel: 23.2cm   30 cm from the heel: 33.1cm     Talked to Dr. Victoria, Podiatry, MD ordered Dakin's to the wound, explained honey will be able to loosen the fibrin and we would like to continue with this at this time.     Wound Cleaning and Dressings:  Showering directions: May shower and/or cleanse wound with mild soap and water  Wound cleansing:  Cleanse with normal saline or wound cleanser  Wound cleaning frequency: Daily  Wound product: Honey gel, Dry gauze, ABD pad, Kerlix, and Paper tape  Dressing change frequency:  Change dressing daily and/or PRN  Enzymatic agent:  Honey    Compression Therapy:   Ace wrap    Negative Pressure Wound Therapy:  NPWT: No negative pressure device therapy needed      Care Summary:  Care Summary: Discussed Plan of Care at beside with patient. Patient verbally acknowledges understanding of all instructions and all questions were answered.      Additional Notes:  Patient will need ongoing care upon discharge to home, Discussed continued care as OP at [facility].       Thank you for this consultation and for allowing me to participate in the care of your patient.      Time Spent 45 Minutes.    Thank you,  Nai Thomas, PT, MPT  Wound Care Clinician  [place] [facility]  6/18/2025  1:44 PM         [1]   Past Medical History:   Atrial fibrillation (HCC)    Esophageal reflux    Hemorrhoids    HIGH  BLOOD PRESSURE    Multiple myeloma (HCC)    RA (rheumatoid arthritis) (HCC)    Unspecified essential hypertension    Visual impairment    wears glasses for distance   [2]   Past Surgical History:  Procedure Laterality Date    Appendectomy      Colonoscopy  2012    Colonoscopy N/A 6/2/2015    Procedure: COLONOSCOPY;  Surgeon: Srini Maria MD;  Location:  ENDOSCOPY    Knee replacement surgery      Right knee    Ligation of hemorrhoid(s)  7/8/2015    Other surgical history      ra nodule removal    Other surgical history      broken jaw repair    Other surgical history      shoulder surg    Skin surgery  2011     Scars to mid frontal scalp and right frontal scalp s/p removal of  & 2011

## 2025-06-18 NOTE — PLAN OF CARE
Received pt a/o x4. VSS. Afebrile. Reporting mild pain in Lt foot. Wound dressing changed. Medication admin per MAR. Off floor for MRI of foot. Orthostatics completed. Call light within reach. Safety precautions in place.        06/18/25 0540 06/18/25 0545 06/18/25 0550   Vital Signs   /64 97/64 (!) 71/41   MAP (mmHg) 73 71 (!) 48   Patient Position Lying Sitting Standing       Problem: SKIN/TISSUE INTEGRITY - ADULT  Goal: Incision(s), wounds(s) or drain site(s) healing without S/S of infection  Description: INTERVENTIONS:  - Assess and document risk factors for pressure ulcer development  - Assess and document skin integrity  - Assess and document dressing/incision, wound bed, drain sites and surrounding tissue  - Implement wound care per orders  - Initiate isolation precautions as appropriate  - Initiate Pressure Ulcer prevention bundle as indicated  Outcome: Progressing     Problem: SAFETY ADULT - FALL  Goal: Free from fall injury  Description: INTERVENTIONS:  - Assess pt frequently for physical needs  - Identify cognitive and physical deficits and behaviors that affect risk of falls.  - [place] fall precautions as indicated by assessment.  - Educate pt/family on patient safety including physical limitations  - Instruct pt to call for assistance with activity based on assessment  - Modify environment to reduce risk of injury  - Provide assistive devices as appropriate  - Consider OT/PT consult to assist with strengthening/mobility  - Encourage toileting schedule  Outcome: Progressing

## 2025-06-18 NOTE — PROGRESS NOTES
[facility] Hospitalist Progress Note                                                                     [facility]   part of [facility]      Oswaldo Piedra  11/22/1940    SUBJECTIVE: feels okay. No chest pain, palpitations, shortness of breath, cough, nausea, vomiting, abdominal pain.  Denies any dizziness today    OBJECTIVE:  Temp:  [97.5 °F (36.4 °C)-98.3 °F (36.8 °C)] 97.5 °F (36.4 °C)  Pulse:  [] 113  Resp:  [14-16] 16  BP: ()/(41-67) 71/41  SpO2:  [95 %-100 %] 100 %  Exam  Gen: No acute distress, alert and oriented   Pulm: Lungs clear bilaterally, normal respiratory effort, no crackles, no wheezing  CV: Heart with regular rate and rhythm, no murmur.   Abd: Abdomen soft, nontender, nondistended, bowel sounds present  MSK: + edema of the LE--> improved, no significant tenderness of the LE  Skin: erythema LLE --> improved overall, most prominent on dorsum left foot- denuded blister in that area    Labs:   Recent Labs   Lab 06/11/25  1638 06/12/25  0607 06/13/25  0619 06/14/25  0528 06/17/25  0550 06/18/25  0630   WBC  --  9.2 7.7 8.1 8.2 8.6   HGB  --  10.8* 10.7* 11.5* 12.0* 12.4*   MCV  --  98.5 99.1 99.4 99.2 99.2   PLT  --  322.0 331.0 351.0 412.0 431.0   INR 1.35*  --   --   --   --   --        Recent Labs   Lab 06/14/25  0528 06/15/25  0616 06/16/25  0622 06/17/25  0550 06/18/25  0630    137 137 137 139   K 4.1  4.1 3.7 3.9  3.9 3.9 3.8    101 99 99 99   CO2 27.0 29.0 29.0 29.0 30.0   BUN 27* 27* 29* 35* 33*   CREATSERUM 1.28 1.19 1.21 1.29 1.12   CA 9.2 10.0 10.3 10.5 10.3   MG 1.6 1.6 1.7 1.7 1.7   GLU 91 110* 106* 120* 105*       Recent Labs   Lab 06/11/25  1638   ALT 11   AST 14   ALB 3.8       Recent Labs   Lab 06/11/25  1950   PGLU 133*       Meds:   Scheduled: Scheduled Medications[1]  Continuous Infusions: Medication Infusions[2]  PRN: PRN Medications[3]    ASSESSMENT / PLAN:   84 year old male with history of  atrial fibrillation, GERD, hypertension, multiple myeloma, rheumatoid arthritis presenting with left leg swelling and erythema.      Left LE cellulitis   -iv abx--> transitioned to oral abx per ID  -overall improving, continue wound care for dorsum of L foot where blister previously was--> blistered area has more drainage -->< wound care, re consult ID and podiatry consult to evaluate for need of debridement --> per ID no change in abx, per podiatry check MRI and may need debridement --> pending results     LE swelling, L >R  -echo okay  -iv lasix has been helpful, with good urine output and stable labs. Will continue for another day--> change to po daily--> hold today as BP borderline and edema overall improved  -reeval in am given lower BP  -LLE doppler neg for DVT     Atrial Fibrillation  -diltiazem  -cardiology consulted for med management given borderline BP and meds being held, also for cardiac preop if debridement needed as above  -xarelto     HTN  -sbp stable/borderline today   -diltiazem  -lisinopril --> hold due to low bp  -was orthostatics     Quality:  DVT Prophylaxis: scd, xarelto  CODE status:   Nelson: none     Plan of care discussed with patient and staff     Dispo: no dc    Markell Johnson MD  [facility] Hospitalist  [phone]           [1]    [Held by provider] furosemide  40 mg Oral Daily    cephalexin  500 mg Oral TID    dilTIAZem ER  360 mg Oral Daily    [Held by provider] lisinopril  2.5 mg Oral Daily    rivaroxaban  15 mg Oral Daily with food    tamsulosin  0.4 mg Oral BID    cholecalciferol  2,000 Units Oral Daily    vitamin E  400 Units Oral Daily   [2] [3]   sodium hypochlorite    calcium carbonate    bisacodyl    HYDROcodone-acetaminophen    LORazepam    acetaminophen    ondansetron    metoclopramide    melatonin

## 2025-06-18 NOTE — PROGRESS NOTES
Orthostatics:       06/18/25 1143 06/18/25 1145 06/18/25 1148   Vital Signs   /66 103/64 90/62   MAP (mmHg) 77 73 68   BP Location Left arm Left arm Left arm   BP Method Automatic Automatic Automatic   Patient Position Lying Sitting Standing

## 2025-06-19 VITALS
HEART RATE: 65 BPM | SYSTOLIC BLOOD PRESSURE: 103 MMHG | DIASTOLIC BLOOD PRESSURE: 49 MMHG | RESPIRATION RATE: 16 BRPM | BODY MASS INDEX: 27.55 KG/M2 | TEMPERATURE: 98 F | OXYGEN SATURATION: 98 % | HEIGHT: 69 IN | WEIGHT: 186 LBS

## 2025-06-19 LAB
ANION GAP SERPL CALC-SCNC: 7 MMOL/L (ref 0–18)
ATRIAL RATE: 70 BPM
BASOPHILS # BLD AUTO: 0.04 X10(3) UL (ref 0–0.2)
BASOPHILS NFR BLD AUTO: 0.6 %
BUN BLD-MCNC: 32 MG/DL (ref 9–23)
CALCIUM BLD-MCNC: 10 MG/DL (ref 8.7–10.6)
CHLORIDE SERPL-SCNC: 102 MMOL/L (ref 98–112)
CO2 SERPL-SCNC: 29 MMOL/L (ref 21–32)
CREAT BLD-MCNC: 1.23 MG/DL (ref 0.7–1.3)
EGFRCR SERPLBLD CKD-EPI 2021: 59 ML/MIN/1.73M2 (ref 60–?)
EOSINOPHIL # BLD AUTO: 0.28 X10(3) UL (ref 0–0.7)
EOSINOPHIL NFR BLD AUTO: 4 %
ERYTHROCYTE [DISTWIDTH] IN BLOOD BY AUTOMATED COUNT: 15.1 %
GLUCOSE BLD-MCNC: 102 MG/DL (ref 70–99)
HCT VFR BLD AUTO: 35.7 % (ref 39–53)
HGB BLD-MCNC: 11.9 G/DL (ref 13–17.5)
IMM GRANULOCYTES # BLD AUTO: 0.06 X10(3) UL (ref 0–1)
IMM GRANULOCYTES NFR BLD: 0.9 %
LYMPHOCYTES # BLD AUTO: 1.68 X10(3) UL (ref 1–4)
LYMPHOCYTES NFR BLD AUTO: 23.9 %
MAGNESIUM SERPL-MCNC: 1.7 MG/DL (ref 1.6–2.6)
MCH RBC QN AUTO: 32.5 PG (ref 26–34)
MCHC RBC AUTO-ENTMCNC: 33.3 G/DL (ref 31–37)
MCV RBC AUTO: 97.5 FL (ref 80–100)
MONOCYTES # BLD AUTO: 1.1 X10(3) UL (ref 0.1–1)
MONOCYTES NFR BLD AUTO: 15.6 %
NEUTROPHILS # BLD AUTO: 3.88 X10 (3) UL (ref 1.5–7.7)
NEUTROPHILS # BLD AUTO: 3.88 X10(3) UL (ref 1.5–7.7)
NEUTROPHILS NFR BLD AUTO: 55 %
OSMOLALITY SERPL CALC.SUM OF ELEC: 293 MOSM/KG (ref 275–295)
PLATELET # BLD AUTO: 456 10(3)UL (ref 150–450)
POTASSIUM SERPL-SCNC: 4.1 MMOL/L (ref 3.5–5.1)
POTASSIUM SERPL-SCNC: 4.1 MMOL/L (ref 3.5–5.1)
Q-T INTERVAL: 350 MS
QRS DURATION: 84 MS
QTC CALCULATION (BEZET): 421 MS
R AXIS: 15 DEGREES
RBC # BLD AUTO: 3.66 X10(6)UL (ref 3.8–5.8)
SODIUM SERPL-SCNC: 138 MMOL/L (ref 136–145)
T AXIS: 43 DEGREES
VENTRICULAR RATE: 87 BPM
WBC # BLD AUTO: 7 X10(3) UL (ref 4–11)

## 2025-06-19 PROCEDURE — 83735 ASSAY OF MAGNESIUM: CPT | Performed by: HOSPITALIST

## 2025-06-19 PROCEDURE — 85025 COMPLETE CBC W/AUTO DIFF WBC: CPT | Performed by: HOSPITALIST

## 2025-06-19 PROCEDURE — 80048 BASIC METABOLIC PNL TOTAL CA: CPT | Performed by: HOSPITALIST

## 2025-06-19 PROCEDURE — 84132 ASSAY OF SERUM POTASSIUM: CPT | Performed by: HOSPITALIST

## 2025-06-19 PROCEDURE — 99212 OFFICE O/P EST SF 10 MIN: CPT

## 2025-06-19 RX ORDER — CEFADROXIL 500 MG/1
500 CAPSULE ORAL 2 TIMES DAILY
Qty: 20 CAPSULE | Refills: 0 | Status: SHIPPED | OUTPATIENT
Start: 2025-06-19 | End: 2025-06-29

## 2025-06-19 RX ORDER — MAGNESIUM OXIDE 400 MG/1
400 TABLET ORAL ONCE
Status: COMPLETED | OUTPATIENT
Start: 2025-06-19 | End: 2025-06-19

## 2025-06-19 RX ORDER — FUROSEMIDE 40 MG/1
40 TABLET ORAL DAILY
Status: SHIPPED | COMMUNITY
Start: 2025-06-19

## 2025-06-19 RX ORDER — CEFADROXIL 500 MG/1
500 CAPSULE ORAL 2 TIMES DAILY
Qty: 20 CAPSULE | Refills: 0 | Status: SHIPPED | OUTPATIENT
Start: 2025-06-19 | End: 2025-06-19

## 2025-06-19 RX ORDER — DILTIAZEM HYDROCHLORIDE 240 MG/1
240 CAPSULE, EXTENDED RELEASE ORAL DAILY
Qty: 30 CAPSULE | Refills: 0 | Status: SHIPPED | OUTPATIENT
Start: 2025-06-20

## 2025-06-19 NOTE — PROGRESS NOTES
Orthostatics:       06/19/25 1058 06/19/25 1100 06/19/25 1101   Vital Signs   Pulse 63 77 92   /62 101/63 90/59   MAP (mmHg) 73 72 66   BP Location Left arm Left arm Left arm   BP Method Automatic Automatic Automatic   Patient Position Lying Sitting Standing

## 2025-06-19 NOTE — DISCHARGE SUMMARY
.  Hospitalist Discharge Summary    Admission Date/Time  6/11/2025  3:57 PM  Discharge Date  06/19/25    PCP  Fortino Ortega MD     Discharging Hospitalist:  Ana Botello MD    Disposition:  home    Follow Up Appointments  Alfonso Victoria, DPM  [address] [facility] [address]  [phone]    Follow up in 2 week(s)  For wound re-check    Appointments to be made by PCP: -    Primary Hospital Problems/Hospital Course Summary  84 year old male with history of atrial fibrillation, GERD, hypertension, multiple myeloma, rheumatoid arthritis presenting with left leg swelling and erythema.      Left LE cellulitis   -iv abx--> transitioned to oral abx per ID  -overall improving, continue wound care for dorsum of L foot where blister previously was. Seen again by ID and podiatry prior to discharge. MRI neg for abscess. To continue with wound care     LE swelling, L >R  -echo okay  -iv lasix helpful but then likely overdiuresed because then orthostatic. Diuretic held at end of stay and pt advised to stay off of lasix until pcp f/u  -LLE doppler neg for DVT     Atrial Fibrillation  -diltiazem, dose reduced due to hypotension relatively  -xarelto     HTN  -had some +orthostasis but improving on day of discharge  -diltiazem, dose reduced. Lisinopril stopped  -lasix stopped and to hold for dc    Medication Changes  Lasix stopped for now  Diltiazem dose reduced, lisinopril stopped  Oral abx    Important Follow Up Items  Labs:  -  Imaging: -  Medications: see if lasix needs to be restarted on diltiazem dose increased or to restart on lisinopril  Incidental findings: -    Procedures/Diagnostics  -    Change in Code Status: -    Discharge Medications       Medication List        START taking these medications      cefadroxil 500 MG Caps  Commonly known as: DURICEF  Take 1 capsule (500 mg total) by mouth 2 (two) times daily for 10 days.            CHANGE how you take these medications      dilTIAZem HCl ER Beads 240 MG Cp24  Commonly  known as: TIAZAC  Take 1 capsule (240 mg total) by mouth daily.  Start taking on: June 20, 2025  What changed:   medication strength  how much to take     furosemide 40 MG Tabs  Commonly known as: Lasix  What changed:   when to take this  additional instructions            CONTINUE taking these medications      acyclovir 200 MG Caps  Commonly known as: Zovirax     allopurinol 100 MG Tabs  Commonly known as: Zyloprim     cholecalciferol 50 MCG (2000 UT) Caps  Commonly known as: Vitamin D3     LORazepam 0.5 MG Tabs  Commonly known as: Ativan     magnesium 250 MG Tabs     Naphazoline-Pheniramine 0.027-0.315 % Soln     omega-3 fatty acids 1000 MG Caps  Commonly known as: Fish Oil     RED YEAST RICE OR     rivaroxaban 15 MG Tabs  Commonly known as: Xarelto  Take 1 tablet (15 mg total) by mouth daily with food.     tamsulosin 0.4 MG Caps  Commonly known as: Flomax  TAKE 1 CAPSULE TWICE DAILY     vitamin C 100 MG Tabs     vitamin E 100 UNITS Caps  Commonly known as: Alpha-E            STOP taking these medications      Humira (2 Pen) 40 MG/0.4ML Pnkt  Generic drug: Adalimumab     lisinopril 2.5 MG Tabs  Commonly known as: Prinivil; Zestril     METAMUCIL FIBER OR               Where to Get Your Medications        These medications were sent to [id number] - [facility] [address] AT [facility] [address], [phone], [phone] [address], [facility] [address]      Hours: 24-hours Phone: [phone]   cefadroxil 500 MG Caps  dilTIAZem HCl ER Beads 240 MG Cp24       I reconciled current and discharge medications on the day of discharge.    Imaging/Diagnostic Reports  MRI FOOT (W+WO), LEFT (CPT=73720)  Result Date: 6/18/2025  PROCEDURE:  MRI FOOT (W+WO), LEFT (CPT=73720)  LOCATION:  History of chronic soft tissue ulcerations of the left foot.  Clinical concern for superimposed infection.  COMPARISON:  None.  INDICATIONS:  chronic ulcer  TECHNIQUE:  A variety of imaging  planes and parameters were utilized for visualization of suspected pathology.  Images were performed without and with intravenous gadolinium contrast.  PATIENT STATED HISTORY:(As transcribed by Technologist)  Chronic ulcer   CONTRAST USED:  18 mL of Dotarem  FINDINGS:  There is suggestion of soft tissue ulceration along the dorsum of the foot with extensive subcutaneous fat induration and edema along the dorsum of the foot suggestive of cellulitis.  There is no evidence of a localized abscess/localized fluid collection  to the dorsum of the foot.  There is no evidence of osteomyelitis of the left foot.  There are moderate to severe osteoarthritic changes involving 1st MTP joint and 1st interphalangeal joint.  No additional regions of significant arthropathy identified.  The flexor and extensor tendons appear intact and within normal limits.  No clinically significant ligamentous abnormalities are noted.            CONCLUSION:  1. Diffuse cellulitis to the dorsum of the foot relegated to the subcutaneous fat.  No discrete evidence of an abscess collection. 2. No evidence of osteomyelitis. 3. 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   LOCATION:  [facility]   Dictated by (CST): Eneida Yuen DO on 2025 at 11:33 AM     Finalized by (CST): Eneida Yuen DO on 2025 at 11:38 AM       CARD ECHO 2D DOPPLER CONTRAST (CPT=93306)  Result Date: 2025  Transthoracic Echocardiogram Name:Oswaldo Piedra Date: 2025 :  1940 Ht:  (69in)  BP: 97 / 50 MRN:  [id number]    Age:  84years    Wt:  (290lb) HR: 90bpm Loc:  [facility]       Gndr: M          BSA: 2.42m^2 Sonographer: Kayla GONZALEZ Ordering:    Markell Johnson Consulting:  Jing Malcolm ---------------------------------------------------------------------------- History/Indications:  Evaluate heart function. ---------------------------------------------------------------------------- Procedure information:  A  transthoracic complete 2D study was performed. Additional evaluation included M-mode, complete spectral Doppler, and color Doppler.  Patient status:  Inpatient.  Location:  Room 425.    No prior study was available for comparison.    This was a routine study. Transthoracic echocardiography for ventricular function evaluation and assessment of valvular function. Image quality was suboptimal. The study was technically limited due to poor acoustic window availability and body habitus. Intravenous contrast (Definity) was administered to opacify the LV. ECG rhythm:   Atrial fibrillation ---------------------------------------------------------------------------- Conclusions: 1. Left ventricle: The cavity size was normal. Wall thickness was normal.    Systolic function was normal. The estimated ejection fraction was 55-60%,    by visual assessment. No diagnostic evidence for regional wall motion    abnormalities. Unable to assess LV diastolic function due to heart    rhythm. 2. Right ventricle: The cavity size was increased. Systolic function was    normal. The RV pressure during systole is 44mm Hg. 3. Left atrium: The atrium was moderately dilated. 4. Mitral valve: Systolic bowing without prolapse. There was mild    regurgitation. 5. Tricuspid valve: There was moderate regurgitation. Impressions:  No previous study from [facility] was available for comparison. * ---------------------------------------------------------------------------- * Findings: Left ventricle:  The cavity size was normal. Wall thickness was normal. Systolic function was normal. The estimated ejection fraction was 55-60%, by visual assessment. No diagnostic evidence for regional wall motion abnormalities. Unable to assess LV diastolic function due to heart rhythm. Left atrium:  The atrium was moderately dilated. Right ventricle:  The cavity size was increased. Systolic function was normal. Right atrium:  The atrium was moderately  dilated. Mitral valve:  The annulus was mildly calcified. The leaflets were mildly calcified. Leaflet separation was normal.  Systolic bowing without prolapse.  Doppler:  Transvalvular velocity was within the normal range. There was no evidence for stenosis. There was mild regurgitation. Aortic valve:   The valve was trileaflet. The leaflets were mildly calcified. Cusp separation was normal.  Doppler:  Transvalvular velocity was within the normal range. There was no evidence for stenosis. There was no significant regurgitation. Tricuspid valve:  The annulus is dilated. The valve is structurally normal. Leaflet separation was normal.  Doppler:  Transvalvular velocity was within the normal range. There was no evidence for stenosis. There was moderate regurgitation. Pulmonic valve:   The valve is structurally normal. Cusp separation was normal.  Doppler:  Transvalvular velocity was within the normal range. There was no evidence for stenosis. There was mild regurgitation. Pericardium:   There was no pericardial effusion. Aorta: Aortic root: The aortic root was normal. Ascending aorta: The ascending aorta was normal. Pulmonary arteries: Systolic pressure was mildly increased, in the range of 40mm Hg to 45mm Hg. Systemic veins: Inferior vena cava: The IVC was poorly visualized. ---------------------------------------------------------------------------- Measurements  Left ventricle                     Value       Ref  IVS thickness, ED, PLAX            0.8   cm    0.6 - 1.0  LV ID, ED, PLAX                    4.2   cm    4.2 - 5.8  LV ID, ES, PLAX                    3.0   cm    2.5 - 4.0  LV PW thickness, ED, PLAX          1.0   cm    0.6 - 1.0  IVS/LV PW ratio, ED, PLAX          0.76        ---------  LV PW/LV ID ratio, ED, PLAX        0.24        ---------  LV ejection fraction               55    %     52 - 72  Aortic root                        Value       Ref  Aortic root ID, ED                 3.3   cm    3.0 - 4.4   Ascending aorta                    Value       Ref  Ascending aorta ID, A-P, ED        3.2   cm    2.2 - 3.8  Left atrium                        Value       Ref  LA ID, A-P, ES                 (H) 4.6   cm    3.0 - 4.0  LA/aortic root ratio               1.39        ---------  Pulmonary artery                   Value       Ref  PA pressure, S, DP                 44    mm Hg ---------  Tricuspid valve                    Value       Ref  Tricuspid regurg peak velocity (H) 3     m/sec <=2.8  Tricuspid peak RV-RA gradient      36    mm Hg ---------  Systemic veins                     Value       Ref  Estimated CVP                      8     mm Hg ---------  Right ventricle                    Value       Ref  RV pressure, S, DP                 44    mm Hg --------- Legend: (L)  and  (H)  gena values outside specified reference range. ---------------------------------------------------------------------------- Prepared and electronically signed by Mariusz Spangler 06/12/2025 17:23     US VENOUS DOPPLER LEG LEFT - DIAG IMG (CPT=93971)  Result Date: 6/11/2025  PROCEDURE:  US VENOUS DOPPLER LEG LEFT - DIAG IMG (CPT=93971)  COMPARISON:  None.  INDICATIONS:  left foot swelling and blister. red and hot to touch per pt  TECHNIQUE:  Real time, grey scale, and duplex ultrasound was used to evaluate the lower extremity venous system. B-mode two-dimensional images of the vascular structures, Doppler spectral analysis, and color flow.  Doppler imaging were performed.  The following veins were imaged:  Common, deep, and superficial femoral, popliteal, sapheno-femoral junction, posterior tibial veins, and the contralateral common femoral vein.  PATIENT STATED HISTORY: (As transcribed by Technologist)  Patient states he has had bilateral lower extremity swelling for over a month and left lower leg redness/blister recently.    FINDINGS:  EXTREMITY EXAMINED:  Left lower extremity SAPHENOFEMORAL JUNCTION:  No reflux. THROMBI:  None visible.  COMPRESSION:  Normal compressibility, phasicity, and augmentation. OTHER:  Subcutaneous edema extends from the level of the knee to the ankle.            CONCLUSION:  1. No sonographic evidence for deep venous thrombosis involving the left lower extremity.   LOCATION:  [facility]    Dictated by (CST): Inga Loyd MD on 6/11/2025 at 5:12 PM     Finalized by (CST): Inga Loyd MD on 6/11/2025 at 5:13 PM           Pertinent Physical Exam At Time of Discharge  Vitals:    06/19/25 1100 06/19/25 1101 06/19/25 1111 06/19/25 1244   BP: 101/63 90/59 113/67 103/49   BP Location: Left arm Left arm Left arm Left arm   Pulse: 77 92 104 65   Resp:    16   Temp:    97.7 °F (36.5 °C)   TempSrc:    Oral   SpO2:    98%   Weight:    186 lb (84.4 kg)   Height:            General: no acute distress  Heart: RRR  Lungs: CTAB  Abd: Soft, NT, ND  Neuro: no focal deficits    Total time coordinating care > 30 mins.    Patient had opportunity to ask questions and stated understanding and agreed with therapeutic plan as outlined.     Ana Botello MD  6/19/2025

## 2025-06-19 NOTE — PROGRESS NOTES
Patient seen at bedside in NAD. No acute events overnight.    ROS  Constitutional: negative for - fever, chills, weight change  Integument: Left foot ulcer  Respiratory: negative for - wheezing, SOB, chest congestion, cough  Cardiovascular: negative for - (+) lower extremity edema, cyanosis, palpitations, chest pain, night cramps  Gastrointestinal: negative for - abdominal pain, diarrhea, heartburn, nausea  Musculoskeletal: negative for -  ankle pain, joint pain, muscle cramps, muscle weakness  Neurological: negative for - tremors, memory loss, paralysis, numbness    Physical exam:   Vitals:    06/18/25 1539   BP: 118/67   Pulse: 79   Resp: 16   Temp: 97.8 °F (36.6 °C)     General: NAD  HEENT: EOMI, PERRLA  Respiratory: No wheezing, no rhonchi  Musculoskeletal: No pain  Integument: Left foot ulcer, erythema  Neuro: No focal deficits  Psych: Mood and affect normal    Lower extremity exam:  DP/PT palpable bilaterally. CFT<3secs all digits. Sensation intact.  Fibronecrotic ulcer noted to dorsal left midfoot with mild drainage, edema    Last A1c value was 5.6% done 4/24/2018.    Lab Results   Component Value Date    WBC 8.6 06/18/2025    HGB 12.4 06/18/2025    HCT 37.6 06/18/2025    .0 06/18/2025    CREATSERUM 1.12 06/18/2025    BUN 33 06/18/2025     06/18/2025    K 3.8 06/18/2025    CL 99 06/18/2025    CO2 30.0 06/18/2025     06/18/2025    CA 10.3 06/18/2025    MG 1.7 06/18/2025       Hospital Encounter on 06/11/25   1. Blood Culture     Status: None    Collection Time: 06/11/25  4:38 PM    Specimen: Blood,peripheral   Result Value Ref Range    Blood Culture Result No Growth 5 Days N/A       MRI FOOT (W+WO), LEFT (CPT=73720)  Result Date: 6/18/2025  CONCLUSION:  1. Diffuse cellulitis to the dorsum of the foot relegated to the subcutaneous fat.  No discrete evidence of an abscess collection. 2. No evidence of osteomyelitis. 3. 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   LOCATION:  [facility]   Dictated by (CST): Eneida Yuen DO on 6/18/2025 at 11:33 AM     Finalized by (CST): Eneida Yuen DO on 6/18/2025 at 11:38 AM       US VENOUS DOPPLER LEG LEFT - DIAG IMG (CPT=93971)  Result Date: 6/11/2025  CONCLUSION:  1. No sonographic evidence for deep venous thrombosis involving the left lower extremity.   LOCATION:  [facility]    Dictated by (CST): Inga Loyd MD on 6/11/2025 at 5:12 PM     Finalized by (CST): Inga Loyd MD on 6/11/2025 at 5:13 PM       Assessment & Plan: 84 year old male with     Chronic nonhealing ulcer of left foot  Left lower extremity cellulitis    Afebrile, no leukocytosis    Chronic left foot ulcer with fibronecrotic base and some mild drainage  Edema decreasing    Left foot MRI negative for deep abscess.   No debridement needed  Continue with local wound care    Antibiotics per ID.  Elevate left lower extremity  Weightbearing as tolerated    Ok to discharge with home wound care and PO abx per ID    Alfonso Victoria D.P.M.

## 2025-06-19 NOTE — PROGRESS NOTES
NURSING DISCHARGE NOTE    Discharged Home via Wheelchair.  Accompanied by RN  Belongings Taken by patient/family.    Removed IV and patient tolerated it well. Discharge papers were given and all questions were answered. All belongings were taken.

## 2025-06-19 NOTE — PLAN OF CARE
Patient is alert and orientatd, VSS, RA, afebrile and doesn't have pain but c/o discomfort to left lower foot due to wound. Wound care followed up with patient today. Patient has adequate appetite, continent and ambulatory with personal standing walker. All meds given. Family at bedside. Orthostatics complete. Plan for discharge. Call light and safety precautions in place.     Problem: SKIN/TISSUE INTEGRITY - ADULT  Goal: Skin integrity remains intact  Description: INTERVENTIONS  - Assess and document risk factors for pressure ulcer development  - Assess and document skin integrity  - Monitor for areas of redness and/or skin breakdown  - Initiate interventions, skin care algorithm/standards of care as needed  Outcome: Progressing  Goal: Incision(s), wounds(s) or drain site(s) healing without S/S of infection  Description: INTERVENTIONS:  - Assess and document risk factors for pressure ulcer development  - Assess and document skin integrity  - Assess and document dressing/incision, wound bed, drain sites and surrounding tissue  - Implement wound care per orders  - Initiate isolation precautions as appropriate  - Initiate Pressure Ulcer prevention bundle as indicated  Outcome: Progressing     Problem: METABOLIC/FLUID AND ELECTROLYTES - ADULT  Goal: Electrolytes maintained within normal limits  Description: INTERVENTIONS:  - Monitor labs and rhythm and assess patient for signs and symptoms of electrolyte imbalances  - Administer electrolyte replacement as ordered  - Monitor response to electrolyte replacements, including rhythm and repeat lab results as appropriate  - Fluid restriction as ordered  - Instruct patient on fluid and nutrition restrictions as appropriate  Outcome: Progressing     Problem: SAFETY ADULT - FALL  Goal: Free from fall injury  Description: INTERVENTIONS:  - Assess pt frequently for physical needs  - Identify cognitive and physical deficits and behaviors that affect risk of falls.  - [place]  fall precautions as indicated by assessment.  - Educate pt/family on patient safety including physical limitations  - Instruct pt to call for assistance with activity based on assessment  - Modify environment to reduce risk of injury  - Provide assistive devices as appropriate  - Consider OT/PT consult to assist with strengthening/mobility  - Encourage toileting schedule  Outcome: Progressing     Problem: DISCHARGE PLANNING  Goal: Discharge to home or other facility with appropriate resources  Description: INTERVENTIONS:  - Identify barriers to discharge w/pt and caregiver  - Include patient/family/discharge partner in discharge planning  - Arrange for needed discharge resources and transportation as appropriate  - Identify discharge learning needs (meds, wound care, etc)  - Arrange for interpreters to assist at discharge as needed  - Consider post-discharge preferences of patient/family/discharge partner  - Complete POLST form as appropriate  - Assess patient's ability to be responsible for managing their own health  - Refer to Case Management Department for coordinating discharge planning if the patient needs post-hospital services based on physician/LIP order or complex needs related to functional status, cognitive ability or social support system  Outcome: Progressing

## 2025-06-19 NOTE — PROGRESS NOTES
Infectious Disease Progress Note      Date of admission: 6/11/2025  3:57 PM     Reason for consult: Left lower extremity cellulitis    Referring physician: Ana Botello MD    Subjective: Patient is feeling well.  No leg redness at this point.  His left foot is slightly puffy with the ulcer. However, no cellulitis.  No major changes over the last 24 hours    The rest of the systems were reviewed and found to be negative except was mentioned above    Interval events: This is an 84-year-old male patient admitted here with volume overload, complicated by left lower extremity cellulitis in the setting of an infected blister  Improved with IV cefazolin, transition to cephalexin on 6/13/2025  MRI of the foot with cellulitis, no abscess or osteomyelitis    Medications:  Current Hospital Medications[1]     Allergies:  Allergies[2]    Physical Exam:  Vitals:    06/19/25 0824   BP: 107/56   Pulse: 69   Resp: 16   Temp: 98 °F (36.7 °C)     Vitals signs and nursing note reviewed.   Constitutional:       Appearance: Normal appearance.   HENT:      Head: Normocephalic and atraumatic.      Mouth: Mucous membranes are moist.   Neck:      Musculoskeletal: Neck supple.   Cardiovascular:      Rate and Rhythm: Normal rate.   Pulmonary:      Effort: Pulmonary effort is normal. No respiratory distress.   Musculoskeletal:      Right lower leg: +1 edema.      Left lower leg: +1 edema.  Improving cellulitis  Skin:     General: Skin is warm and dry.   Neurological:      General: No focal deficit present.      Mental Status: Alert and oriented to person, place, and time.     Laboratory data:  I have reviewed all the lab results independently.  Lab Results   Component Value Date    WBC 7.0 06/19/2025    HGB 11.9 06/19/2025    HCT 35.7 06/19/2025    .0 06/19/2025    CREATSERUM 1.23 06/19/2025    BUN 32 06/19/2025     06/19/2025    K 4.1 06/19/2025    K 4.1 06/19/2025     06/19/2025    CO2 29.0 06/19/2025      06/19/2025    CA 10.0 06/19/2025    MG 1.7 06/19/2025      Recent Labs   Lab 06/19/25  0613   RBC 3.66*   HGB 11.9*   HCT 35.7*   MCV 97.5   MCH 32.5   MCHC 33.3   RDW 15.1   NEPRELIM 3.88   WBC 7.0   .0*      Microbiology data:  Hospital Encounter on 06/11/25   1. Blood Culture     Status: None    Collection Time: 06/11/25  4:38 PM    Specimen: Blood,peripheral   Result Value Ref Range    Blood Culture Result No Growth 5 Days N/A     Impression:  Oswaldo Piedra is a 84 year old male with    Left lower extremity cellulitis in the setting of volume overload and blistering  Foot ulcer noted  No major changes noted involving the foot  MRI of the foot without osteomyelitis or abscess, no plans for surgical intervention  Currently on cephalexin  Volume overload  Diuresis as per primary team    Recommendations:    Continue cephalexin for now  Patient can be discharged on cefadroxil 500 mg twice daily for the next 10 days  Follow-up with infectious disease in 1 week  Continue with wound care  Okay to discharge from ID perspective  ID will sign off, please call us with any questions or changes status.  Thank you for this consultation.    The plan of care was discussed with the primary hospital team, Ana Botello MD     Recommendations were also discussed with the patient; all questions were answered.     Thank you for this consultation. Please don't hesitate to call the ID team for questions or any acute changes in patient's clinical condition.    Please note that this report has been produced using speech recognition software and may contain errors related to that system including, but not limited to, errors in grammar, punctuation, and spelling, as well as words and phrases that possibly may have been recognized inappropriately.  If there are any questions or concerns, contact the dictating provider for clarification.    The 21st Century Cures Act makes medical notes like these available to patients in  the interest of transparency. Please be advised this is a medical document. Medical documents are intended to carry relevant information, facts as evident, and the clinical opinion of the practitioner. The medical note is intended as peer to peer communication and may appear blunt or direct. It is written in medical language and may contain abbreviations or verbiage that are unfamiliar.     Shantel Manzanares MD  [facility]. Tel: [phone]. Fax: [phone].     Oswaldo Rober Dorothea : 1940 MRN: [id number] CSN: [id number]          [1]   dilTIAZem ER    [Held by provider] furosemide    sodium hypochlorite    calcium carbonate    bisacodyl    cephalexin    HYDROcodone-acetaminophen    LORazepam    [Held by provider] lisinopril    rivaroxaban    tamsulosin    cholecalciferol    vitamin E    acetaminophen    ondansetron    metoclopramide    melatonin  [2]   Allergies  Allergen Reactions    Amlodipine      Pedal edema    Naprosyn [Naproxen] OTHER (SEE COMMENTS)     Instructed by pcp in [place], no naprosyn due to liver damage.

## 2025-06-19 NOTE — PLAN OF CARE
Received pt a/o x4. VSS. Afebrile. Reporting Lt foot pain, prn tylenol given. Medication admin per MAR. Wound dressing & ace wrap intact. Orthostatics completed. Call light within reach. Safety precautions in place.     Problem: SKIN/TISSUE INTEGRITY - ADULT  Goal: Skin integrity remains intact  Description: INTERVENTIONS  - Assess and document risk factors for pressure ulcer development  - Assess and document skin integrity  - Monitor for areas of redness and/or skin breakdown  - Initiate interventions, skin care algorithm/standards of care as needed  Outcome: Progressing     Problem: SAFETY ADULT - FALL  Goal: Free from fall injury  Description: INTERVENTIONS:  - Assess pt frequently for physical needs  - Identify cognitive and physical deficits and behaviors that affect risk of falls.  - [place] fall precautions as indicated by assessment.  - Educate pt/family on patient safety including physical limitations  - Instruct pt to call for assistance with activity based on assessment  - Modify environment to reduce risk of injury  - Provide assistive devices as appropriate  - Consider OT/PT consult to assist with strengthening/mobility  - Encourage toileting schedule  Outcome: Progressing

## 2025-06-19 NOTE — PROGRESS NOTES
[facility] Card[facility]  Consultation Note      Oswaldo Piedra Patient Status:  Inpatient    1940 MRN [id number]   Location [facility] 4NW-A Attending Markell Johnson MD   Hosp Day # 7 PCP Fortino Ortega MD     Reason for consult: AF, hypotension    Events: Stable. Denies CP or SOB.     Primary cardiologist: Axel     History of Present Illness:  Oswaldo Piedra is a 84 year old male with PAF who presented to [facility] on 2025 with LE wounds, swelling.  Being treated for LLE cellulitis, wound care and possible debridement planned. AF stable, rate controlled, diltiazem held due to soft BP. He had significant LE edema and has been given IV lasix, wt 205->191 lbs. This morning was dizzy standing with + orthostatics, ->86 sit to stand. No CP. Some chronic CRONIN. No palps, syncope.     Medications:  Current Hospital Medications[1]    Past Medical History[2]    Past Surgical History[3]    Family History  family history includes Cancer in his father; Heart Disorder in his brother, mother, and sister; Hypertension in his mother; Other in his father and mother.    Social History   reports that he quit smoking about 48 years ago. His smoking use included cigarettes. He has quit using smokeless tobacco. He reports current alcohol use. He reports that he does not use drugs.     Allergies  Allergies[4]    Review of Systems:  As per HPI, otherwise 10 point ROS is negative in detail.    Physical Exam:  Blood pressure 107/56, pulse 69, temperature 98 °F (36.7 °C), temperature source Oral, resp. rate 16, height 69\", weight 183 lb 11.2 oz (83.3 kg), SpO2 99%.  Temp (24hrs), Av.1 °F (36.7 °C), Min:97.6 °F (36.4 °C), Max:98.7 °F (37.1 °C)    Wt Readings from Last 3 Encounters:   25 183 lb 11.2 oz (83.3 kg)   12/10/24 213 lb 9.6 oz (96.9 kg)   22 217 lb (98.4 kg)       General: Awake and alert; in no acute distress  HEENT: Extraocular movements are intact; sclerae are  anicteric; scalp is atraumatic  Neck: Supple; no JVD; no carotid bruits  Cardiac: Irreg ireg, variable S1, nl S2,  no murmurs, rubs, or gallops are appreciated  Lungs: Clear to auscultation bilaterally; no accessory muscle use is noted, no wheezes, rhonci or rales  Abdomen: Soft, non-distended, non-tender; bowel sounds are normoactive  Extremities: LLE wrapped. Trace pedal edema.   Psychiatric: Normal mood and affect; answers questions appropriately  Dermatologic: No rashes; normal skin turgor    Diagnostic testing:    Labs:   Lab Results   Component Value Date    INR 1.35 (H) 06/11/2025        Lab Results   Component Value Date    WBC 7.0 06/19/2025    HGB 11.9 06/19/2025    HCT 35.7 06/19/2025    .0 06/19/2025    CREATSERUM 1.23 06/19/2025    BUN 32 06/19/2025     06/19/2025    K 4.1 06/19/2025    K 4.1 06/19/2025     06/19/2025    CO2 29.0 06/19/2025     06/19/2025    CA 10.0 06/19/2025    MG 1.7 06/19/2025       Cardiac diagnostics:    EKG 6/17/2025: pending    Echo 6/17/2025:  1. Left ventricle: The cavity size was normal. Wall thickness was normal.      Systolic function was normal. The estimated ejection fraction was 55-60%,      by visual assessment. No diagnostic evidence for regional wall motion      abnormalities. Unable to assess LV diastolic function due to heart      rhythm.   2. Right ventricle: The cavity size was increased. Systolic function was      normal. The RV pressure during systole is 44mm Hg.   3. Left atrium: The atrium was moderately dilated.   4. Mitral valve: Systolic bowing without prolapse. There was mild      regurgitation.   5. Tricuspid valve: There was moderate regurgitation.     Impression:  1. Paroxysmal Atrial Fibrillation- post op after L TKA 9/2013- no recurrence, until found incidentally on 3/28/19 visit. Rate controlled and on Xarelto.  2. LLE wound, cellulitis, h/o Left TKA 9/17/13, right 2001.   3. HTN - controlled. BP soft, with + orthostatic  hypotension 6/17/2025 (104->86)  4. RA - stable  5. GERD - no complaints  6. H/o wide complex tachycardia - suspected aberrancy. No recurrence   7. Anemia - stable  8. Renal insufficiency/proteinuria - stable. On low dose lisinopril. nephro.  9. HLD - stable (takes red yeast rice)     Recommendations:  S/p IV lasix with wt 205->191 lbs->183 lbs. Improvement in LE edema. But orthostatic hypotension, SBP 70s with stand. Continue holding lasix. Reduced diltiazem to 240mg daily.     Weight and orthostatics for today are still pending  Continue Xarelto - currently renal dose on 15mg daily, Cr Cl borderline ~ 51. If remains > 50 consistently, would increase to 20mg daily.   Serum creatinine: 1.12 mg/dL 06/18/25 0630  Estimated creatinine clearance: 50.9 mL/min    Thank you for allowing our practice to participate in the care of your patient. Please do not hesitate to contact me if you have any questions.    Mariusz Reyna MD  Interventional Cardiology  [facility]  Office: [phone]         [1]   Current Facility-Administered Medications   Medication Dose Route Frequency    dilTIAZem ER (Dilacor XR) 24 hr cap 240 mg  240 mg Oral Daily    [Held by provider] furosemide (Lasix) tab 40 mg  40 mg Oral Daily    sodium hypochlorite (Dakin's) 0.25 % external solution   Topical PRN    calcium carbonate (Tums) chewable tab 1,000 mg  1,000 mg Oral Q6H PRN    bisacodyl (Dulcolax) 10 MG rectal suppository 10 mg  10 mg Rectal Daily PRN    cephALEXin (Keflex) cap 500 mg  500 mg Oral TID    HYDROcodone-acetaminophen (Norco) 5-325 MG per tab 1 tablet  1 tablet Oral Q6H PRN    LORazepam (Ativan) tab 0.5 mg  0.5 mg Oral Nightly PRN    [Held by provider] lisinopril (Prinivil; Zestril) tab 2.5 mg  2.5 mg Oral Daily    rivaroxaban (Xarelto) tab 15 mg  15 mg Oral Daily with food    tamsulosin (Flomax) cap 0.4 mg  0.4 mg Oral BID    cholecalciferol (Vitamin D3) tab 2,000 Units  2,000 Units Oral Daily    vitamin E (Alpha-E) cap 400 Units   400 Units Oral Daily    acetaminophen (Tylenol Extra Strength) tab 500 mg  500 mg Oral Q4H PRN    ondansetron (Zofran) 4 MG/2ML injection 4 mg  4 mg Intravenous Q6H PRN    metoclopramide (Reglan) 5 mg/mL injection 5 mg  5 mg Intravenous Q8H PRN    melatonin tab 3 mg  3 mg Oral Nightly PRN   [2]   Past Medical History:   Atrial fibrillation (HCC)    Esophageal reflux    Hemorrhoids    HIGH BLOOD PRESSURE    Multiple myeloma (HCC)    RA (rheumatoid arthritis) (HCC)    Unspecified essential hypertension    Visual impairment    wears glasses for distance   [3]   Past Surgical History:  Procedure Laterality Date    Appendectomy      Colonoscopy  2012    Colonoscopy N/A 6/2/2015    Procedure: COLONOSCOPY;  Surgeon: Srini Maria MD;  Location:  ENDOSCOPY    Knee replacement surgery      Right knee    Ligation of hemorrhoid(s)  7/8/2015    Other surgical history      ra nodule removal    Other surgical history      broken jaw repair    Other surgical history      shoulder surg    Skin surgery  2011     Scars to mid frontal scalp and right frontal scalp s/p removal of  & 2011   [4]   Allergies  Allergen Reactions    Amlodipine      Pedal edema    Naprosyn [Naproxen] OTHER (SEE COMMENTS)     Instructed by pcp in [place], no naprosyn due to liver damage.

## 2025-06-20 ENCOUNTER — PATIENT OUTREACH (OUTPATIENT)
Dept: CASE MANAGEMENT | Age: 85
End: 2025-06-20

## 2025-06-20 NOTE — PROGRESS NOTES
Hospital follow-up appt / Discharged 6/19 [facility]      PCP Request :  Follow up with Fortino Ortega in 1 week Specialty: Internal Medicine [facility] [address] [facility] [address] [phone]              [facility] Cardiology :  Follow up with Mariusz Reyna in 1 week Specialty: CARDIOLOGY [facility] [address] [facility] [address] [phone]          Attempt #1:  Left message on voicemail for patient to call transitions specialist back to schedule follow up appointments. Provided Transitions specialist scheduling phone number [phone].

## 2025-06-20 NOTE — PROGRESS NOTES
Voicemail received; Patient's sisterEvangelina retuning call  Called patient's sister back     PCP / Specialist appointment request (discharged 06/19)    Dr Fortino Ortega  Internal Medicine  [facility]  [address]  [place], [place] [address] [phone]  Apt made:  Mon 06/23 @11:30am    Dr Mariusz Reyna  Cardiology  [facility]  [address]  [place], [place] [id number] [phone]  Follow up 1 week  Apt made:  Mon 06/30 @3:30pm w/RADHA [address]  Podiatrist  [facility]  [address] [address] [phone]  Follow up 2 weeks  Apt made:  Tue 07/08 @9:50am  Confirmed w/pt's sisterEvangelina  Closing encounter

## 2025-06-26 ENCOUNTER — OFFICE VISIT (OUTPATIENT)
Dept: WOUND CARE | Facility: HOSPITAL | Age: 85
End: 2025-06-26
Attending: FAMILY MEDICINE
Payer: MEDICARE

## 2025-06-26 VITALS
SYSTOLIC BLOOD PRESSURE: 120 MMHG | HEIGHT: 70 IN | RESPIRATION RATE: 16 BRPM | HEART RATE: 66 BPM | BODY MASS INDEX: 27.06 KG/M2 | WEIGHT: 189 LBS | TEMPERATURE: 98 F | DIASTOLIC BLOOD PRESSURE: 75 MMHG

## 2025-06-26 DIAGNOSIS — Z87.2 HISTORY OF CELLULITIS: ICD-10-CM

## 2025-06-26 DIAGNOSIS — I10 PRIMARY HYPERTENSION: ICD-10-CM

## 2025-06-26 DIAGNOSIS — I48.20 ATRIAL FIBRILLATION, CHRONIC (HCC): ICD-10-CM

## 2025-06-26 DIAGNOSIS — L97.522 NON-PRESSURE CHRONIC ULCER OF OTHER PART OF LEFT FOOT WITH FAT LAYER EXPOSED (HCC): Primary | ICD-10-CM

## 2025-06-26 DIAGNOSIS — M06.09 RHEUMATOID ARTHRITIS OF MULTIPLE SITES WITH NEGATIVE RHEUMATOID FACTOR (HCC): ICD-10-CM

## 2025-06-26 DIAGNOSIS — N18.30 STAGE 3 CHRONIC KIDNEY DISEASE, UNSPECIFIED WHETHER STAGE 3A OR 3B CKD (HCC): ICD-10-CM

## 2025-06-26 DIAGNOSIS — C90.00 MULTIPLE MYELOMA, REMISSION STATUS UNSPECIFIED (HCC): ICD-10-CM

## 2025-06-26 PROCEDURE — 99205 OFFICE O/P NEW HI 60 MIN: CPT | Performed by: FAMILY MEDICINE

## 2025-06-26 PROCEDURE — 97597 DBRDMT OPN WND 1ST 20 CM/<: CPT | Performed by: FAMILY MEDICINE

## 2025-06-26 RX ORDER — MONTELUKAST SODIUM 10 MG/1
10 TABLET ORAL NIGHTLY
COMMUNITY

## 2025-06-26 RX ORDER — MULTIVIT WITH MINERALS/LUTEIN
1000 TABLET ORAL DAILY
COMMUNITY

## 2025-06-26 RX ORDER — CEFADROXIL 500 MG/1
CAPSULE ORAL 2 TIMES DAILY
COMMUNITY

## 2025-06-26 RX ORDER — LISINOPRIL 2.5 MG/1
2.5 TABLET ORAL DAILY
COMMUNITY

## 2025-06-26 RX ORDER — ACETAMINOPHEN 500 MG
500 TABLET ORAL EVERY 6 HOURS PRN
COMMUNITY

## 2025-06-26 RX ORDER — DIPHENHYDRAMINE HCL 25 MG
25 CAPSULE ORAL EVERY 6 HOURS PRN
COMMUNITY

## 2025-06-26 NOTE — PROGRESS NOTES
Chief Complaint   Patient presents with    Wound Care     Patient is here for an initial consult. He presents with a wound on his left foot. The wound began about 3 weeks ago. He has been using honey on the wound since being in house.        HPI:     Patient is a 84-year-old male new patient here for evaluation of wound on the top of his left foot.  Started out with a blister he states several weeks ago and then led to severe swelling and redness to the left foot and cellulitis and he was treated in the hospital for that and released.  He is doing much better with that.  He has been putting Medihoney gel to the wound.  He did have MRI done as well.  He had seen infectious disease and podiatry.    Lab Results   Component Value Date    BUN 32 (H) 06/19/2025    CREATSERUM 1.23 06/19/2025    GFRCKDEPI 35.03 (L) 04/16/2021    ALB 3.8 06/11/2025    TP 6.3 06/11/2025    A1C 5.6 04/24/2018       Medications - Current[1]    Allergies[2]         REVIEW OF SYSTEMS:     Review of Systems (ROS)  This information was obtained from the patient, chart    A comprehensive 10 point review of systems was completed.  Pertinent positives and negatives noted in the the HPI.     Past medical, surgical, family and social history updated where appropriate.  Past Medical History[3]  PHYSICAL EXAM:   /75   Pulse 66   Temp 97.6 °F (36.4 °C)   Resp 16   Ht 70\"   Wt 189 lb (85.7 kg)   BMI 27.12 kg/m²    Estimated body mass index is 27.12 kg/m² as calculated from the following:    Height as of this encounter: 70\".    Weight as of this encounter: 189 lb (85.7 kg).   Vital signs reviewed.Appears stated age, well groomed.        Calf  Point of Measurement - Left Calf: 43  Point of Measurement - Right Calf: 43  Left Calf from:: Heel  Calf Left cm:: 41.3  Right Calf from:: Heel  Right Calf cm:: 37.9    Ankle  Point of Measurement - Left Ankle: 12  Point of Measurement - Right Ankle: 12  Left Ankle from:: Heel  Left Ankle cm:: 24     Right  Ankle from:: Heel  Right Ankle cm:: 23       Foot           Left Heel to Knee: 47           Right Heel to Knee: 47       Wound 06/11/25 Sacrum Inner (Active)   Date First Assessed/Time First Assessed: 06/11/25 2100   Present on Original Admission: Yes  Primary Wound Type: Pressure Injury  Location: Sacrum  Wound Location Orientation: Inner  Wound Description (Comments): excoriation, superficial open ulcer      Assessments 6/11/2025  9:00 PM 6/19/2025  8:32 AM   Wound Image      Site Assessment Dry --   Drainage Amount None --   Treatments Cleansed --   Dressing Mepilex Open to air   Dressing Changed Changed --   Dressing Status Clean;Dry;Intact --   Pressure Injury Stage Stage 2 --       No associated orders.       Wound 06/11/25 Foot Dorsal;Left (Active)   Date First Assessed/Time First Assessed: 06/11/25 2100   Present on Original Admission: Yes  Primary Wound Type: (c) Other (comment)  Location: Foot  Wound Location Orientation: Dorsal;Left  Wound Description (Comments): wound bed almost covered with ...      Assessments 6/11/2025  9:00 PM 6/19/2025  1:35 PM   Wound Image       Site Assessment Edema;Fragile;Moist;Tan;Yellow Unable to assess   Drainage Amount Scant --   Drainage Description Serosanguineous --   Dressing Aquacel;4x4s Ace bandage   Dressing Changed Changed --   Dressing Status Intact;Dry;Clean Dry;Clean;Intact (PER PATIENT WOUND CARE ASSESSED, CLEANSED AND RE-DRESSED)   Tunneling? No --       Inactive Orders   Date Order Priority Status Authorizing Provider   06/19/25 1430 Wound care Routine Discontinued Ana Botello MD   06/18/25 1412 Wound care Routine Discontinued Markell Johnson MD   06/12/25 0028 Consult to Wound Ostomy Routine Completed Markell Johnson MD     - Reason for Consult:    Wound Care     - Wound Care Reason for Consult:    wound care       Wound 06/26/25 #1 Left dorsal foot Foot Dorsal;Left (Active)   Date First Assessed/Time First Assessed: 06/26/25 0943    Wound Number (Wound  Clinic Only): #1 Left dorsal foot  Primary Wound Type: Edema  Location: Foot  Wound Location Orientation: Dorsal;Left      Assessments 6/26/2025  9:44 AM 6/26/2025  9:45 AM   Wound Image       Drainage Amount Moderate --   Drainage Description Serous;Yellow --   Wound Length (cm) 3.7 cm 3.7 cm   Wound Width (cm) 3.3 cm 3.3 cm   Wound Surface Area (cm^2) 9.59 cm^2 9.59 cm^2   Wound Depth (cm) 0.3 cm 0.3 cm   Wound Volume (cm^3) 1.918 cm^3 1.918 cm^3   Wound Healing % -- 0   Margins Well-defined edges --   Non-staged Wound Description Full thickness --   Nica-wound Assessment Blanchable erythema;Edema --   Wound Bed Slough (%) 100 % --   Wound Odor None --   Tunneling? No --   Undermining? No --   Sinus Tracts? No --       Inactive Orders   Date Order Priority Status Authorizing Provider   06/26/25 1218 Debridement Edema Dorsal;Left Foot Routine Completed Khanh Castillo MD              ASSESSMENT AND PLAN:      1. 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 History of cellulitis    3. Multiple myeloma, remission status unspecified ([facility])    4. Atrial fibrillation, chronic ([facility])    5. Rheumatoid arthritis of multiple sites with negative rheumatoid factor ([facility])    6. Stage 3 chronic kidney disease, unspecified whether stage 3a or 3b CKD ([facility])    7. Primary hypertension    Clinically the wound has thick slough which was debrided with scissors and forceps and curette.  See debridement note.  Patient tolerated well topical lidocaine used.  There is no periwound erythema and very minimal if any swelling to the foot and ankle.  There is no increased warmth.  He did have MRI and this was negative for osteomyelitis or abscess.  He does have chronic conditions which can impair wound healing including rheumatoid arthritis and multiple myeloma.  I will have him continue with Medihoney gel to the wound base at this time and use zinc oxide to the periwound and cover with border gauze  dressing or similar dressing.  She should change the dressing on a daily basis.  He is taking a diuretic and being followed by cardiology and this can help control leg swelling.  Of note patient is not diabetic.  Blood pressure is under control today and atrial fibrillation is stable.  His wife is present who will be assisting with dressing changes.  Will have home health continue to come out once a week at least to help out with dressing changes.      Risks, benefits, and alternatives of current treatment plan discussed in detail.  Questions and concerns addressed. Red flags to RTC or ED reviewed.  Patient (or parent) agrees to plan.      Return in about 1 week (around 7/3/2025).    Also refer to patient instructions.     I spent a total of 60 minutes with this patient's care.  This time included preparing to see the patient (eg, review notes and recent diagnostics), seeing the patient, performing a medically appropriate examination and/or evaluation, counseling and educating the patient, and documenting in the record.          Khanh Castillo M.D.   [facility]   2025    Patient Instructions       PATIENT INSTRUCTIONS      FOR Oswaldo Richter Dorothea , [place] 1940    DATE OF SERVICE: 2025      Apply Medihoney gel(or Manukapli gel) to the wound base    Cover with border gauze dressing    Change on a daily basis    DO NOT get the wound wet, use a cast cover or shower boot which can be purchased at a pharmacy    Home health to assist with dressing changes 1-2 times per week    Follow up with Dr. Castillo 1 WEEK        MISCELLANEOUS INSTRUCTIONS  Supplement with a daily multivitamin   Low salt diet  Intense blood sugar control - Goal Blood sugar below 180 at all times recommended.  Increase protein intake / consider protein supplements - see below  Elevate extremities at all times when sitting / laying down.  No tobacco use     DIETARY MODIFICATIONS TO HELP WITH WOUND HEALING:           Please follow any restrictions to diet given by your other doctors     Protein: meats, beans, eggs, milk and yogurt particularly Greek yogurt), tofu, soy nuts, soy protein products     Vitamin C: citrus fruits and juices, strawberries, tomatoes, tomato juice, peppers, baked potatoes, spinach, broccoli, cauliflower, [place] sprouts, cabbage     Vitamin A: dark greens, leafy vegetables, orange or yellow vegetables, cantaloupe, fortified dairy products, liver, fortified cereals     Zinc: fortified cereals, red meats, seafood     Consider Elías by [employer] (These are essential branch chain amino acids that help with tissue building and wound healing) and take 2 packets/day. You can order online at [employer] or [employer]      Available at hospital pharmacy as well.      ADDITIONAL REMINDERS:     The treatment plan has been discussed at length with you and your provider. Follow all instructions carefully, it is very important. If you do not follow all instructions, you are at risk of your wound not healing, infection, possible loss of limb and even loss of life.  Please call the clinic at [phone] during regular business hours ( 7:30 AM - 5:30 PM) if you notice increased redness, warmth, pain or pus like drainage or start running a fever greater than 100.3.    For after hour emergencies, please call your primary physician or go to the nearest emergency room.  If your blood pressure is elevated, follow up with your primary care doctor and/or cardiologist as soon as possible.                 MISCELLANEOUS INSTRUCTIONS  Supplement with a daily multivitamin   Low salt diet  Intense blood sugar control - Goal Blood sugar below 180 at all times recommended.  Increase protein intake / consider protein supplements - see below  Elevate extremities at all times when sitting / laying down.  No tobacco use     DIETARY MODIFICATIONS TO HELP WITH WOUND HEALING:          Please follow any restrictions to diet given by your other  doctors     Protein: meats, beans, eggs, milk and yogurt particularly Greek yogurt), tofu, soy nuts, soy protein products     Vitamin C: citrus fruits and juices, strawberries, tomatoes, tomato juice, peppers, baked potatoes, spinach, broccoli, cauliflower, [place] sprouts, cabbage     Vitamin A: dark greens, leafy vegetables, orange or yellow vegetables, cantaloupe, fortified dairy products, liver, fortified cereals     Zinc: fortified cereals, red meats, seafood     Consider Elías by [employer] (These are essential branch chain amino acids that help with tissue building and wound healing) and take 2 packets/day. You can order online at [employer] or [employer]     ADDITIONAL REMINDERS:     The treatment plan has been discussed at length with you and your provider. Follow all instructions carefully, it is very important. If you do not follow all instructions, you are at risk of your wound not healing, infection, possible loss of limb and even loss of life.  Please call the clinic at [phone] during regular business hours ( 7:30 AM - 5:30 PM) if you notice increased redness, warmth, pain or pus like drainage or start running a fever greater than 100.3.    For after hour emergencies, please call your primary physician or go to the nearest emergency room.  If your blood pressure is elevated, follow up with your primary care doctor and/or cardiologist as soon as possible.     FOR LEG SWELLING,  LOWER EXTREMITY WOUNDS AND IF USING COMPRESSION:   Managing your edema:    Avoid prolonged standing in one place. It is better to have your calf muscles moving to pump fluid out of the legs.  Elevate leg(s) above the level of the heart when sitting or as much as possible.  Take you diuretics as directed by your physician. Do not skip doses or change doses unless instructed to do so by your physician.  Decrease salt intake, follow recommended 2 grams daily.  Do not get leg(s) with compression wrap wet. If wraps are too tight as  indicated by pain, numbness/tingling or discoloration of toes remove wrap completely and call the wound center @ [phone].       The treatment plan has been discussed at length between you and your provider. Follow all instructions carefully, it is very important. If you do not follow all instructions you are at risk of your wound not healing, infection, possible loss of limb and even loss of life.    COMPRESSION WRAP PATIENT CARE INSTRUCTIONS  DO NOT get compression wrap wet. When showering, use a shower boot.   Please contact wound clinic and if not able to reach, then go to emergency room if ANY of the following occur:   Tingling or numbness in the foot or toes on leg with compression wrap.  Severe pain that cannot be relieved with elevation and any medication instructed per your doctor.  A fever of 100.4°F (38°C) or higher.  Swelling, coldness, or blue-gray discoloration of the toes.  If the compression wrap feels too tight or too loose.  If the compression wrap is damaged or has rough edges that hurt.  If the compression wrap gets wet, you must cut wrap off.   Drainage from compression wrap that smells different than usual.                        [1]   Current Outpatient Medications:     lisinopril 2.5 MG Oral Tab, Take 1 tablet (2.5 mg total) by mouth daily., Disp: , Rfl:     Cholecalciferol 50 MCG (2000 UT) Oral Cap, Take by mouth., Disp: , Rfl:     vitamin E 400 UNITS Oral Cap, Take 1,000 Units by mouth daily., Disp: , Rfl:     acetaminophen 500 MG Oral Tab, Take 1 tablet (500 mg total) by mouth every 6 (six) hours as needed for Pain., Disp: , Rfl:     montelukast 10 MG Oral Tab, Take 1 tablet (10 mg total) by mouth nightly., Disp: , Rfl:     diphenhydrAMINE 25 MG Oral Cap, Take 1 capsule (25 mg total) by mouth every 6 (six) hours as needed for Itching., Disp: , Rfl:     cefadroxil 500 MG Oral Cap, Take by mouth 2 (two) times daily., Disp: , Rfl:     empagliflozin (JARDIANCE) 10 MG Oral Tab, Take by  mouth daily., Disp: , Rfl:     dilTIAZem  MG Oral Capsule SR 24 Hr, Take 1 capsule (240 mg total) by mouth daily., Disp: 30 capsule, Rfl: 0    furosemide 40 MG Oral Tab, Take 1 tablet (40 mg total) by mouth daily. HOLD LASIX UNTIL FOLLOW-UP WITH PCP, Disp: , Rfl:     cefadroxil 500 MG Oral Cap, Take 1 capsule (500 mg total) by mouth 2 (two) times daily for 10 days., Disp: 20 capsule, Rfl: 0    allopurinol 100 MG Oral Tab, Take 1 tablet (100 mg total) by mouth daily., Disp: , Rfl:     acyclovir 200 MG Oral Cap, Take 1 capsule (200 mg total) by mouth 2 (two) times daily., Disp: , Rfl:     vitamin E 100 UNITS Oral Cap, Take 1 capsule (100 Units total) by mouth in the morning., Disp: , Rfl:     Ascorbic Acid (VITAMIN C) 100 MG Oral Tab, Take 1 tablet (100 mg total) by mouth in the morning., Disp: , Rfl:     rivaroxaban (XARELTO) 15 MG Oral Tab, Take 1 tablet (15 mg total) by mouth daily with food., Disp: 90 tablet, Rfl: 3    tamsulosin (FLOMAX) cap, TAKE 1 CAPSULE TWICE DAILY, Disp: 180 capsule, Rfl: 3    magnesium 250 MG Oral Tab, Take 1 tablet (250 mg total) by mouth., Disp: , Rfl:     Vitamin D3 2000 units Oral Cap, Take 1 capsule (2,000 Units total) by mouth in the morning. Every other day., Disp: , Rfl: 0    Red Yeast Rice Extract (RED YEAST RICE OR), One capsules BID, Disp: , Rfl:     empagliflozin 10 MG Oral Tab, Take 1 tablet (10 mg total) by mouth daily. (Patient not taking: Reported on 6/26/2025), Disp: , Rfl:     LORazepam 0.5 MG Oral Tab, Take 1 tablet (0.5 mg total) by mouth nightly as needed for Anxiety., Disp: , Rfl:     omega-3 fatty acids 1000 MG Oral Cap, Take 1,000 mg by mouth in the morning. (Patient not taking: Reported on 6/26/2025), Disp: , Rfl:   [2]   Allergies  Allergen Reactions    Amlodipine      Pedal edema    Naprosyn [Naproxen] OTHER (SEE COMMENTS)     Instructed by pcp in [place], no naprosyn due to liver damage.   [3]   Past Medical History:   Atrial fibrillation (HCC)     Esophageal reflux    Hemorrhoids    HIGH BLOOD PRESSURE    Multiple myeloma (HCC)    RA (rheumatoid arthritis) (HCC)    Unspecified essential hypertension    Visual impairment    wears glasses for distance

## 2025-06-26 NOTE — PROGRESS NOTES
Weekly Wound Education Note    Teaching Provided To: Patient  Training Topics: Cleasing and general instructions, Discharge instructions, Dressing  Training Method: Explain/Verbal, Demonstration  Training Response: Reinforcement needed        Notes: Patient here for inital consult to left dorsal foot wound. Patient states wound began about 3 weeks ago, he has been using honey on the wound since being admitted to hospital. Per Infectious diease note- patient was discharged on oral cefadroxil 500mg twice daily for 10 days at discharge. Patient had MRI to left foot on 6/18/25 and US doppler on 6/11/25. Provider debrided wound at visit today, recommending patient to apply zinc to periwound, honey gel to wound bed covered with 4x4 bordered gauze dressing. Patient states he has home health services. RN to send updated orders. Patient to follow up with provider in 1 week.

## 2025-06-26 NOTE — PATIENT INSTRUCTIONS
PATIENT INSTRUCTIONS      FOR Oswaldo Piedra ,  1940    DATE OF SERVICE: 2025      Apply Medihoney gel(or Manukapli gel) to the wound base    Cover with border gauze dressing    Change on a daily basis    DO NOT get the wound wet, use a cast cover or shower boot which can be purchased at a pharmacy    Home health to assist with dressing changes 1-2 times per week    Follow up with Dr. Castillo 1 WEEK        MISCELLANEOUS INSTRUCTIONS  Supplement with a daily multivitamin   Low salt diet  Intense blood sugar control - Goal Blood sugar below 180 at all times recommended.  Increase protein intake / consider protein supplements - see below  Elevate extremities at all times when sitting / laying down.  No tobacco use     DIETARY MODIFICATIONS TO HELP WITH WOUND HEALING:          Please follow any restrictions to diet given by your other doctors     Protein: meats, beans, eggs, milk and yogurt particularly Greek yogurt), tofu, soy nuts, soy protein products     Vitamin C: citrus fruits and juices, strawberries, tomatoes, tomato juice, peppers, baked potatoes, spinach, broccoli, cauliflower, [place] sprouts, cabbage     Vitamin A: dark greens, leafy vegetables, orange or yellow vegetables, cantaloupe, fortified dairy products, liver, fortified cereals     Zinc: fortified cereals, red meats, seafood     Consider Elías by [employer] (These are essential branch chain amino acids that help with tissue building and wound healing) and take 2 packets/day. You can order online at [employer] or [employer].      Available at hospital pharmacy as well.      ADDITIONAL REMINDERS:     The treatment plan has been discussed at length with you and your provider. Follow all instructions carefully, it is very important. If you do not follow all instructions, you are at risk of your wound not healing, infection, possible loss of limb and even loss of life.  Please call the clinic at [phone] during regular business  hours ( 7:30 AM - 5:30 PM) if you notice increased redness, warmth, pain or pus like drainage or start running a fever greater than 100.3.    For after hour emergencies, please call your primary physician or go to the nearest emergency room.  If your blood pressure is elevated, follow up with your primary care doctor and/or cardiologist as soon as possible.

## 2025-06-26 NOTE — PROGRESS NOTES
Patient ID: Oswaldo Piedra is a 84 year old male.    Debridement Edema Dorsal;Left Foot   Wound 06/26/25 #1 Left dorsal foot Foot Dorsal;Left    Performed by: Khanh Castillo MD  Authorized by: Khanh Castillo MD      Consent   Consent obtained? verbal  Consent given by: patient    Debridement Details  Performed by: physician  Debridement type: conservative sharp    Pre-debridement measurements  Length (cm): 3.7  Width (cm): 3.3  Depth (cm): 0.3  Surface Area (cm^2): 9.59    Post-debridement measurements  Length (cm): 3.7  Width (cm): 3.3  Depth (cm): 0.3  Percent debrided: 100%  Surface Area (cm^2): 9.59  Area Debrided (cm^2): 9.59  Volume (cm^3): 1.92    Devitalized tissue debrided: slough  Instrument(s) utilized: curette  Bleeding: none  Hemostasis obtained with: not applicable  Response to treatment: procedure was tolerated well

## 2025-06-27 ENCOUNTER — TELEPHONE (OUTPATIENT)
Dept: PODIATRY | Age: 85
End: 2025-06-27

## 2025-07-01 ENCOUNTER — APPOINTMENT (OUTPATIENT)
Dept: PODIATRY | Age: 85
End: 2025-07-01

## 2025-07-01 DIAGNOSIS — B35.1 FUNGAL NAIL INFECTION: Primary | ICD-10-CM

## 2025-07-01 DIAGNOSIS — B35.1 PAIN DUE TO ONYCHOMYCOSIS OF NAIL: ICD-10-CM

## 2025-07-01 DIAGNOSIS — L60.2 LONG TOENAIL: ICD-10-CM

## 2025-07-01 DIAGNOSIS — M79.609 PAIN DUE TO ONYCHOMYCOSIS OF NAIL: ICD-10-CM

## 2025-07-01 RX ORDER — LORAZEPAM 0.5 MG/1
0.5 TABLET ORAL
COMMUNITY

## 2025-07-01 RX ORDER — ALLOPURINOL 100 MG/1
100 TABLET ORAL DAILY
COMMUNITY

## 2025-07-01 RX ORDER — PROCHLORPERAZINE MALEATE 10 MG
TABLET ORAL
COMMUNITY
Start: 2025-02-20

## 2025-07-01 RX ORDER — ONDANSETRON 8 MG/1
TABLET, FILM COATED ORAL
COMMUNITY
Start: 2025-02-20

## 2025-07-01 RX ORDER — MONTELUKAST SODIUM 10 MG/1
10 TABLET ORAL NIGHTLY
COMMUNITY

## 2025-07-01 RX ORDER — LISINOPRIL 2.5 MG/1
2.5 TABLET ORAL
COMMUNITY

## 2025-07-01 RX ORDER — DIPHENOXYLATE HYDROCHLORIDE AND ATROPINE SULFATE 2.5; .025 MG/1; MG/1
TABLET ORAL
COMMUNITY
Start: 2025-05-01

## 2025-07-01 RX ORDER — DEXAMETHASONE 4 MG/1
TABLET ORAL
COMMUNITY
Start: 2025-06-04

## 2025-07-01 RX ORDER — ACYCLOVIR 200 MG/1
200 CAPSULE ORAL
COMMUNITY

## 2025-07-01 RX ORDER — DILTIAZEM HYDROCHLORIDE 240 MG/1
240 CAPSULE, EXTENDED RELEASE ORAL
COMMUNITY
Start: 2025-06-20

## 2025-07-01 RX ORDER — RIVAROXABAN 15 MG/1
TABLET, FILM COATED ORAL
COMMUNITY
Start: 2025-05-13

## 2025-07-01 RX ORDER — LENALIDOMIDE 10 MG/1
CAPSULE ORAL
COMMUNITY
Start: 2025-04-29

## 2025-07-01 RX ORDER — MUPIROCIN 2 %
OINTMENT (GRAM) TOPICAL
COMMUNITY
Start: 2025-02-05

## 2025-07-01 RX ORDER — FUROSEMIDE 40 MG/1
40 TABLET ORAL
COMMUNITY
Start: 2025-06-19

## 2025-07-01 RX ORDER — MULTIVITAMIN WITH IRON
250 TABLET ORAL
COMMUNITY

## 2025-07-03 ENCOUNTER — OFFICE VISIT (OUTPATIENT)
Dept: WOUND CARE | Facility: HOSPITAL | Age: 85
End: 2025-07-03
Attending: FAMILY MEDICINE
Payer: MEDICARE

## 2025-07-03 VITALS — SYSTOLIC BLOOD PRESSURE: 107 MMHG | HEART RATE: 76 BPM | DIASTOLIC BLOOD PRESSURE: 69 MMHG | TEMPERATURE: 97 F

## 2025-07-03 DIAGNOSIS — C90.00 MULTIPLE MYELOMA, REMISSION STATUS UNSPECIFIED (HCC): ICD-10-CM

## 2025-07-03 DIAGNOSIS — L97.522 NON-PRESSURE CHRONIC ULCER OF OTHER PART OF LEFT FOOT WITH FAT LAYER EXPOSED (HCC): Primary | ICD-10-CM

## 2025-07-03 DIAGNOSIS — Z87.2 HISTORY OF CELLULITIS: ICD-10-CM

## 2025-07-03 PROCEDURE — 99214 OFFICE O/P EST MOD 30 MIN: CPT | Performed by: FAMILY MEDICINE

## 2025-07-03 NOTE — PROGRESS NOTES
Chief Complaint   Patient presents with    Wound Recheck     Left dorsal foot wound recheck. Went to the podiatrist this past Tuesday With Aurea Delcid.        HPI:     Patient will follow-up left proximal foot wound.  He is doing well and using Medihoney gel on daily basis.    Lab Results   Component Value Date    BUN 32 (H) 06/19/2025    CREATSERUM 1.23 06/19/2025    GFRCKDEPI 35.03 (L) 04/16/2021    ALB 3.8 06/11/2025    TP 6.3 06/11/2025    A1C 5.6 04/24/2018       Medications - Current[1]    Allergies[2]         REVIEW OF SYSTEMS:     Review of Systems (ROS)  This information was obtained from the patient, chart    A comprehensive 10 point review of systems was completed.  Pertinent positives and negatives noted in the the HPI.     Past medical, surgical, family and social history updated where appropriate.  Past Medical History[3]  PHYSICAL EXAM:   /69   Pulse 76   Temp 97.3 °F (36.3 °C)    Estimated body mass index is 27.12 kg/m² as calculated from the following:    Height as of 6/26/25: 70\".    Weight as of 6/26/25: 189 lb (85.7 kg).   Vital signs reviewed.Appears stated age, well groomed.        Calf                      Ankle                            Foot                               Wound 06/26/25 #1 Left dorsal foot Foot Dorsal;Left (Active)   Date First Assessed/Time First Assessed: 06/26/25 0943    Wound Number (Wound Clinic Only): #1 Left dorsal foot  Primary Wound Type: Edema  Location: Foot  Wound Location Orientation: Dorsal;Left      Assessments 6/26/2025  9:44 AM 7/3/2025  1:51 PM   Wound Image       Drainage Amount Moderate Moderate   Drainage Description Serous;Yellow Serous;Yellow   Wound Length (cm) 3.7 cm 3.5 cm   Wound Width (cm) 3.3 cm 3.6 cm   Wound Surface Area (cm^2) 9.59 cm^2 9.9 cm^2   Wound Depth (cm) 0.3 cm 0.2 cm   Wound Volume (cm^3) 1.918 cm^3 1.319 cm^3   Wound Healing % -- 31   Margins Well-defined edges Well-defined edges   Non-staged Wound Description Full thickness  Full thickness   Nica-wound Assessment Blanchable erythema;Edema Blanchable erythema;Edema   Wound Granulation Tissue -- Pink;Firm   Wound Bed Granulation (%) -- 5 %   Wound Bed Slough (%) 100 % 95 %   Wound Odor None None   Tunneling? No No   Undermining? No No   Sinus Tracts? No No       Inactive Orders   Date Order Priority Status Authorizing Provider   25 1218 Debridement Edema Dorsal;Left Foot Routine Completed Khanh Castillo MD              ASSESSMENT AND PLAN:      1. 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 History of cellulitis    3. Multiple myeloma, remission status unspecified (HCC)    Clinically the wound is much better and the slough is thinning out nicely.  Will continue with Medihoney gel to the wound and they may also use it twice a day and cover with a border gauze dressing this will speed up the healing rate.  Patient will follow-up with me in about 2 weeks.  Foot swelling is under control.  There is no maceration to the wound edges.      Risks, benefits, and alternatives of current treatment plan discussed in detail.  Questions and concerns addressed. Red flags to RTC or ED reviewed.  Patient (or parent) agrees to plan.      No follow-ups on file.    Also refer to patient instructions.     I spent a total of 30 minutes with this patient's care.  This time included preparing to see the patient (eg, review notes and recent diagnostics), seeing the patient, performing a medically appropriate examination and/or evaluation, counseling and educating the patient, and documenting in the record.          Khanh Castillo M.D.   [facility]   7/3/2025    Patient Instructions       PATIENT INSTRUCTIONS      FOR CITLALY Barlow 1940    DATE OF SERVICE: 7/3/2025      Apply Medihoney gel(or Manukapli gel) to the wound base    Cover with border gauze dressing    Change 2 times a day if possible    DO NOT get the  wound wet, use a cast cover or shower boot which can be purchased at a pharmacy    Home health to assist with dressing changes 1-2 times per week    Follow up with Dr. Castillo 2 WEEKS        MISCELLANEOUS INSTRUCTIONS  Supplement with a daily multivitamin   Low salt diet  Intense blood sugar control - Goal Blood sugar below 180 at all times recommended.  Increase protein intake / consider protein supplements - see below  Elevate extremities at all times when sitting / laying down.  No tobacco use     DIETARY MODIFICATIONS TO HELP WITH WOUND HEALING:          Please follow any restrictions to diet given by your other doctors     Protein: meats, beans, eggs, milk and yogurt particularly Greek yogurt), tofu, soy nuts, soy protein products     Vitamin C: citrus fruits and juices, strawberries, tomatoes, tomato juice, peppers, baked potatoes, spinach, broccoli, cauliflower, [place] sprouts, cabbage     Vitamin A: dark greens, leafy vegetables, orange or yellow vegetables, cantaloupe, fortified dairy products, liver, fortified cereals     Zinc: fortified cereals, red meats, seafood     Consider Elías by [email or web address] (These are essential branch chain amino acids that help with tissue building and wound healing) and take 2 packets/day. You can order online at [employer] or [employer].      Available at hospital pharmacy as well.      ADDITIONAL REMINDERS:     The treatment plan has been discussed at length with you and your provider. Follow all instructions carefully, it is very important. If you do not follow all instructions, you are at risk of your wound not healing, infection, possible loss of limb and even loss of life.  Please call the clinic at [phone] during regular business hours ( 7:30 AM - 5:30 PM) if you notice increased redness, warmth, pain or pus like drainage or start running a fever greater than 100.3.    For after hour emergencies, please call your primary physician or go to the nearest emergency room.  If  your blood pressure is elevated, follow up with your primary care doctor and/or cardiologist as soon as possible.               MISCELLANEOUS INSTRUCTIONS  Supplement with a daily multivitamin   Low salt diet  Intense blood sugar control - Goal Blood sugar below 180 at all times recommended.  Increase protein intake / consider protein supplements - see below  Elevate extremities at all times when sitting / laying down.  No tobacco use     DIETARY MODIFICATIONS TO HELP WITH WOUND HEALING:          Please follow any restrictions to diet given by your other doctors     Protein: meats, beans, eggs, milk and yogurt particularly Greek yogurt), tofu, soy nuts, soy protein products     Vitamin C: citrus fruits and juices, strawberries, tomatoes, tomato juice, peppers, baked potatoes, spinach, broccoli, cauliflower, [place] sprouts, cabbage     Vitamin A: dark greens, leafy vegetables, orange or yellow vegetables, cantaloupe, fortified dairy products, liver, fortified cereals     Zinc: fortified cereals, red meats, seafood     Consider Elías by [employer] (These are essential branch chain amino acids that help with tissue building and wound healing) and take 2 packets/day. You can order online at [employer] or [employer]     ADDITIONAL REMINDERS:     The treatment plan has been discussed at length with you and your provider. Follow all instructions carefully, it is very important. If you do not follow all instructions, you are at risk of your wound not healing, infection, possible loss of limb and even loss of life.  Please call the clinic at [phone] during regular business hours ( 7:30 AM - 5:30 PM) if you notice increased redness, warmth, pain or pus like drainage or start running a fever greater than 100.3.    For after hour emergencies, please call your primary physician or go to the nearest emergency room.  If your blood pressure is elevated, follow up with your primary care doctor and/or cardiologist as soon as  possible.     FOR LEG SWELLING,  LOWER EXTREMITY WOUNDS AND IF USING COMPRESSION:   Managing your edema:    Avoid prolonged standing in one place. It is better to have your calf muscles moving to pump fluid out of the legs.  Elevate leg(s) above the level of the heart when sitting or as much as possible.  Take you diuretics as directed by your physician. Do not skip doses or change doses unless instructed to do so by your physician.  Decrease salt intake, follow recommended 2 grams daily.  Do not get leg(s) with compression wrap wet. If wraps are too tight as indicated by pain, numbness/tingling or discoloration of toes remove wrap completely and call the wound center @ [phone].       The treatment plan has been discussed at length between you and your provider. Follow all instructions carefully, it is very important. If you do not follow all instructions you are at risk of your wound not healing, infection, possible loss of limb and even loss of life.    COMPRESSION WRAP PATIENT CARE INSTRUCTIONS  DO NOT get compression wrap wet. When showering, use a shower boot.   Please contact wound clinic and if not able to reach, then go to emergency room if ANY of the following occur:   Tingling or numbness in the foot or toes on leg with compression wrap.  Severe pain that cannot be relieved with elevation and any medication instructed per your doctor.  A fever of 100.4°F (38°C) or higher.  Swelling, coldness, or blue-gray discoloration of the toes.  If the compression wrap feels too tight or too loose.  If the compression wrap is damaged or has rough edges that hurt.  If the compression wrap gets wet, you must cut wrap off.   Drainage from compression wrap that smells different than usual.                        [1]   Current Outpatient Medications:     lisinopril 2.5 MG Oral Tab, Take 1 tablet (2.5 mg total) by mouth daily., Disp: , Rfl:     Cholecalciferol 50 MCG (2000 UT) Oral Cap, Take by mouth., Disp: , Rfl:      vitamin E 400 UNITS Oral Cap, Take 1,000 Units by mouth daily., Disp: , Rfl:     acetaminophen 500 MG Oral Tab, Take 1 tablet (500 mg total) by mouth every 6 (six) hours as needed for Pain., Disp: , Rfl:     montelukast 10 MG Oral Tab, Take 1 tablet (10 mg total) by mouth nightly., Disp: , Rfl:     diphenhydrAMINE 25 MG Oral Cap, Take 1 capsule (25 mg total) by mouth every 6 (six) hours as needed for Itching., Disp: , Rfl:     cefadroxil 500 MG Oral Cap, Take by mouth 2 (two) times daily., Disp: , Rfl:     empagliflozin (JARDIANCE) 10 MG Oral Tab, Take by mouth daily., Disp: , Rfl:     dilTIAZem  MG Oral Capsule SR 24 Hr, Take 1 capsule (240 mg total) by mouth daily., Disp: 30 capsule, Rfl: 0    furosemide 40 MG Oral Tab, Take 1 tablet (40 mg total) by mouth daily. HOLD LASIX UNTIL FOLLOW-UP WITH PCP, Disp: , Rfl:     empagliflozin 10 MG Oral Tab, Take 1 tablet (10 mg total) by mouth daily. (Patient not taking: Reported on 6/26/2025), Disp: , Rfl:     allopurinol 100 MG Oral Tab, Take 1 tablet (100 mg total) by mouth daily., Disp: , Rfl:     acyclovir 200 MG Oral Cap, Take 1 capsule (200 mg total) by mouth 2 (two) times daily., Disp: , Rfl:     LORazepam 0.5 MG Oral Tab, Take 1 tablet (0.5 mg total) by mouth nightly as needed for Anxiety., Disp: , Rfl:     vitamin E 100 UNITS Oral Cap, Take 1 capsule (100 Units total) by mouth in the morning., Disp: , Rfl:     Ascorbic Acid (VITAMIN C) 100 MG Oral Tab, Take 1 tablet (100 mg total) by mouth in the morning., Disp: , Rfl:     rivaroxaban (XARELTO) 15 MG Oral Tab, Take 1 tablet (15 mg total) by mouth daily with food., Disp: 90 tablet, Rfl: 3    tamsulosin (FLOMAX) cap, TAKE 1 CAPSULE TWICE DAILY, Disp: 180 capsule, Rfl: 3    omega-3 fatty acids 1000 MG Oral Cap, Take 1,000 mg by mouth in the morning. (Patient not taking: Reported on 6/26/2025), Disp: , Rfl:     magnesium 250 MG Oral Tab, Take 1 tablet (250 mg total) by mouth., Disp: , Rfl:     Vitamin D3 2000  units Oral Cap, Take 1 capsule (2,000 Units total) by mouth in the morning. Every other day., Disp: , Rfl: 0    Red Yeast Rice Extract (RED YEAST RICE OR), One capsules BID, Disp: , Rfl:   [2]   Allergies  Allergen Reactions    Amlodipine      Pedal edema    Naprosyn [Naproxen] OTHER (SEE COMMENTS)     Instructed by pcp in [place], no naprosyn due to liver damage.   [3]   Past Medical History:   Atrial fibrillation (HCC)    Esophageal reflux    Hemorrhoids    HIGH BLOOD PRESSURE    Multiple myeloma (HCC)    RA (rheumatoid arthritis) (HCC)    Unspecified essential hypertension    Visual impairment    wears glasses for distance

## 2025-07-03 NOTE — PROGRESS NOTES
Weekly Wound Education Note    Teaching Provided To: Patient  Training Topics: Cleasing and general instructions, Dressing  Training Method: Explain/Verbal, Demonstration  Training Response: Reinforcement needed        Notes: Patient here for follow up wound care visit to left dorsal foot. Provider recommending patient to continue using honey gel, 4x4 bordered gauze dressing daily. Patient to follow up with provider in 2 weeks.

## 2025-07-03 NOTE — PATIENT INSTRUCTIONS
PATIENT INSTRUCTIONS      FOR Oswaldo Piedra ,  1940    DATE OF SERVICE: 7/3/2025      Apply Medihoney gel(or Manukapli gel) to the wound base    Cover with border gauze dressing    Change 2 times a day if possible    DO NOT get the wound wet, use a cast cover or shower boot which can be purchased at a pharmacy    Home health to assist with dressing changes 1-2 times per week    Follow up with Dr. Castillo 2 WEEKS        MISCELLANEOUS INSTRUCTIONS  Supplement with a daily multivitamin   Low salt diet  Intense blood sugar control - Goal Blood sugar below 180 at all times recommended.  Increase protein intake / consider protein supplements - see below  Elevate extremities at all times when sitting / laying down.  No tobacco use     DIETARY MODIFICATIONS TO HELP WITH WOUND HEALING:          Please follow any restrictions to diet given by your other doctors     Protein: meats, beans, eggs, milk and yogurt particularly Greek yogurt), tofu, soy nuts, soy protein products     Vitamin C: citrus fruits and juices, strawberries, tomatoes, tomato juice, peppers, baked potatoes, spinach, broccoli, cauliflower, [place] sprouts, cabbage     Vitamin A: dark greens, leafy vegetables, orange or yellow vegetables, cantaloupe, fortified dairy products, liver, fortified cereals     Zinc: fortified cereals, red meats, seafood     Consider Elías by [employer] (These are essential branch chain amino acids that help with tissue building and wound healing) and take 2 packets/day. You can order online at [employer] or [employer].      Available at hospital pharmacy as well.      ADDITIONAL REMINDERS:     The treatment plan has been discussed at length with you and your provider. Follow all instructions carefully, it is very important. If you do not follow all instructions, you are at risk of your wound not healing, infection, possible loss of limb and even loss of life.  Please call the clinic at [phone] during regular  business hours ( 7:30 AM - 5:30 PM) if you notice increased redness, warmth, pain or pus like drainage or start running a fever greater than 100.3.    For after hour emergencies, please call your primary physician or go to the nearest emergency room.  If your blood pressure is elevated, follow up with your primary care doctor and/or cardiologist as soon as possible.

## 2025-07-17 ENCOUNTER — OFFICE VISIT (OUTPATIENT)
Dept: WOUND CARE | Facility: HOSPITAL | Age: 85
End: 2025-07-17
Attending: FAMILY MEDICINE
Payer: MEDICARE

## 2025-07-17 VITALS
TEMPERATURE: 98 F | RESPIRATION RATE: 18 BRPM | HEART RATE: 84 BPM | DIASTOLIC BLOOD PRESSURE: 64 MMHG | SYSTOLIC BLOOD PRESSURE: 106 MMHG

## 2025-07-17 DIAGNOSIS — N18.30 STAGE 3 CHRONIC KIDNEY DISEASE, UNSPECIFIED WHETHER STAGE 3A OR 3B CKD (HCC): ICD-10-CM

## 2025-07-17 DIAGNOSIS — C90.00 MULTIPLE MYELOMA, REMISSION STATUS UNSPECIFIED (HCC): ICD-10-CM

## 2025-07-17 DIAGNOSIS — M06.09 RHEUMATOID ARTHRITIS OF MULTIPLE SITES WITH NEGATIVE RHEUMATOID FACTOR (HCC): ICD-10-CM

## 2025-07-17 DIAGNOSIS — Z87.2 HISTORY OF CELLULITIS: ICD-10-CM

## 2025-07-17 DIAGNOSIS — L97.522 NON-PRESSURE CHRONIC ULCER OF OTHER PART OF LEFT FOOT WITH FAT LAYER EXPOSED (HCC): Primary | ICD-10-CM

## 2025-07-17 DIAGNOSIS — R60.0 EDEMA OF LEFT LOWER LEG: ICD-10-CM

## 2025-07-17 PROCEDURE — 99215 OFFICE O/P EST HI 40 MIN: CPT | Performed by: FAMILY MEDICINE

## 2025-07-17 RX ORDER — CEPHALEXIN 500 MG/1
500 CAPSULE ORAL 3 TIMES DAILY
Qty: 21 CAPSULE | Refills: 0 | Status: SHIPPED | OUTPATIENT
Start: 2025-07-17 | End: 2025-07-24

## 2025-07-17 NOTE — PROGRESS NOTES
Chief Complaint   Patient presents with    Wound Recheck     Patient is here for a follow-up of his left dorsal foot wound.       HPI:     Patient here for follow-up of left dorsal foot wound.  He developed significant edema to the right lower extremity and did have episode of pain in the leg but no calf tenderness denies any chest pain or shortness of breath.  Denies any fever or chills.  He has been using Medihoney gel to the wound and has been getting better.    Lab Results   Component Value Date    BUN 32 (H) 06/19/2025    CREATSERUM 1.23 06/19/2025    GFRCKDEPI 35.03 (L) 04/16/2021    ALB 3.8 06/11/2025    TP 6.3 06/11/2025    A1C 5.6 04/24/2018       Medications - Current[1]    Allergies[2]         REVIEW OF SYSTEMS:     Review of Systems (ROS)  This information was obtained from the patient, chart    A comprehensive 10 point review of systems was completed.  Pertinent positives and negatives noted in the the HPI.     Past medical, surgical, family and social history updated where appropriate.  Past Medical History[3]  PHYSICAL EXAM:   /64   Pulse 84   Temp 98 °F (36.7 °C)   Resp 18    Estimated body mass index is 27.12 kg/m² as calculated from the following:    Height as of 6/26/25: 70\".    Weight as of 6/26/25: 189 lb (85.7 kg).   Vital signs reviewed.Appears stated age, well groomed.        Calf  Point of Measurement - Left Calf: 39  Point of Measurement - Right Calf: 39  Left Calf from:: Heel  Calf Left cm:: 47  Right Calf from:: Heel       Ankle  Point of Measurement - Left Ankle: 12  Point of Measurement - Right Ankle: 12  Left Ankle from:: Heel  Left Ankle cm:: 28.4     Right Ankle from:: Heel          Foot                               Wound 06/26/25 #1 Left dorsal foot Foot Dorsal;Left (Active)   Date First Assessed/Time First Assessed: 06/26/25 0943    Wound Number (Wound Clinic Only): #1 Left dorsal foot  Primary Wound Type: Edema  Location: Foot  Wound Location Orientation: Dorsal;Left       Assessments 6/26/2025  9:44 AM 7/17/2025  2:04 PM   Wound Image       Drainage Amount Moderate Moderate   Drainage Description Serous;Yellow Serous;Yellow   Wound Length (cm) 3.7 cm 3.3 cm   Wound Width (cm) 3.3 cm 3.4 cm   Wound Surface Area (cm^2) 9.59 cm^2 8.81 cm^2   Wound Depth (cm) 0.3 cm 0.2 cm   Wound Volume (cm^3) 1.918 cm^3 1.175 cm^3   Wound Healing % -- 39   Margins Well-defined edges Well-defined edges   Non-staged Wound Description Full thickness Full thickness   Nica-wound Assessment Blanchable erythema;Edema Blanchable erythema;Edema   Wound Granulation Tissue -- Pink;Firm   Wound Bed Granulation (%) -- 15 %   Wound Bed Slough (%) 100 % 85 %   Wound Odor None None   Tunneling? No No   Undermining? No No   Sinus Tracts? No No       Inactive Orders   Date Order Priority Status Authorizing Provider   06/26/25 1218 Debridement Edema Dorsal;Left Foot Routine Completed Khanh Castillo MD              ASSESSMENT AND PLAN:      1. 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 History of cellulitis    3. Multiple myeloma, remission status unspecified ([facility])    4. Rheumatoid arthritis of multiple sites with negative rheumatoid factor ([facility])    5. Stage 3 chronic kidney disease, unspecified whether stage 3a or 3b CKD ([facility])    Clinically the wound seems to continue to improve there is more budding granulation noted.  Slough is becoming less.  He does have severe swelling with pitting edema of the right lower extremity.  He has no history of any congestive heart failure.  Palpable pedal pulses.  He would benefit from compression wrap.  Will apply Enluxtra to the wound itself and cover with Kerramax and initiate Coflex lite 2030 mm compression wrap to the level of the left lower extremity.  We did go over compression wrap precautions at length.  Risk and benefit discussed.  Patient will follow-up with nurse visit in 1 week.  There may be very early signs of mild  cellulitis localized to the ankle region but this is also very unclear.  I will treat him empirically with the Keflex 5 mg 3 times a day for 7 days.  The compression wrap may also be advanced to 30 to 40 mmHg depending on how well he tolerates it.  Home health can assist with dressing changes.  Will have nurse visit tomorrow and then weekly for the next 3 weeks on Friday and then I will see him back in 4 weeks.      Risks, benefits, and alternatives of current treatment plan discussed in detail.  Questions and concerns addressed. Red flags to RTC or ED reviewed.  Patient (or parent) agrees to plan.      No follow-ups on file.    Also refer to patient instructions.     I spent a total of 45 minutes with this patient's care.  This time included preparing to see the patient (eg, review notes and recent diagnostics), seeing the patient, performing a medically appropriate examination and/or evaluation, counseling and educating the patient, and documenting in the record.          Khanh Castillo M.D.   [facility]   7/17/2025    There are no Patient Instructions on file for this visit.  MISCELLANEOUS INSTRUCTIONS  Supplement with a daily multivitamin   Low salt diet  Intense blood sugar control - Goal Blood sugar below 180 at all times recommended.  Increase protein intake / consider protein supplements - see below  Elevate extremities at all times when sitting / laying down.  No tobacco use     DIETARY MODIFICATIONS TO HELP WITH WOUND HEALING:          Please follow any restrictions to diet given by your other doctors     Protein: meats, beans, eggs, milk and yogurt particularly Greek yogurt), tofu, soy nuts, soy protein products     Vitamin C: citrus fruits and juices, strawberries, tomatoes, tomato juice, peppers, baked potatoes, spinach, broccoli, cauliflower, [place] sprouts, cabbage     Vitamin A: dark greens, leafy vegetables, orange or yellow vegetables, cantaloupe, fortified dairy  products, liver, fortified cereals     Zinc: fortified cereals, red meats, seafood     Consider Elías by [employer] (These are essential branch chain amino acids that help with tissue building and wound healing) and take 2 packets/day. You can order online at [employer] or [employer]     ADDITIONAL REMINDERS:     The treatment plan has been discussed at length with you and your provider. Follow all instructions carefully, it is very important. If you do not follow all instructions, you are at risk of your wound not healing, infection, possible loss of limb and even loss of life.  Please call the clinic at [phone] during regular business hours ( 7:30 AM - 5:30 PM) if you notice increased redness, warmth, pain or pus like drainage or start running a fever greater than 100.3.    For after hour emergencies, please call your primary physician or go to the nearest emergency room.  If your blood pressure is elevated, follow up with your primary care doctor and/or cardiologist as soon as possible.     FOR LEG SWELLING,  LOWER EXTREMITY WOUNDS AND IF USING COMPRESSION:   Managing your edema:    Avoid prolonged standing in one place. It is better to have your calf muscles moving to pump fluid out of the legs.  Elevate leg(s) above the level of the heart when sitting or as much as possible.  Take you diuretics as directed by your physician. Do not skip doses or change doses unless instructed to do so by your physician.  Decrease salt intake, follow recommended 2 grams daily.  Do not get leg(s) with compression wrap wet. If wraps are too tight as indicated by pain, numbness/tingling or discoloration of toes remove wrap completely and call the wound center @ [phone].       The treatment plan has been discussed at length between you and your provider. Follow all instructions carefully, it is very important. If you do not follow all instructions you are at risk of your wound not healing, infection, possible loss of limb and  even loss of life.    COMPRESSION WRAP PATIENT CARE INSTRUCTIONS  DO NOT get compression wrap wet. When showering, use a shower boot.   Please contact wound clinic and if not able to reach, then go to emergency room if ANY of the following occur:   Tingling or numbness in the foot or toes on leg with compression wrap.  Severe pain that cannot be relieved with elevation and any medication instructed per your doctor.  A fever of 100.4°F (38°C) or higher.  Swelling, coldness, or blue-gray discoloration of the toes.  If the compression wrap feels too tight or too loose.  If the compression wrap is damaged or has rough edges that hurt.  If the compression wrap gets wet, you must cut wrap off.   Drainage from compression wrap that smells different than usual.                        [1]   Current Outpatient Medications:     lisinopril 2.5 MG Oral Tab, Take 1 tablet (2.5 mg total) by mouth daily., Disp: , Rfl:     Cholecalciferol 50 MCG (2000 UT) Oral Cap, Take by mouth., Disp: , Rfl:     vitamin E 400 UNITS Oral Cap, Take 1,000 Units by mouth daily., Disp: , Rfl:     acetaminophen 500 MG Oral Tab, Take 1 tablet (500 mg total) by mouth every 6 (six) hours as needed for Pain., Disp: , Rfl:     montelukast 10 MG Oral Tab, Take 1 tablet (10 mg total) by mouth nightly., Disp: , Rfl:     diphenhydrAMINE 25 MG Oral Cap, Take 1 capsule (25 mg total) by mouth every 6 (six) hours as needed for Itching., Disp: , Rfl:     cefadroxil 500 MG Oral Cap, Take by mouth 2 (two) times daily., Disp: , Rfl:     empagliflozin (JARDIANCE) 10 MG Oral Tab, Take by mouth daily., Disp: , Rfl:     dilTIAZem  MG Oral Capsule SR 24 Hr, Take 1 capsule (240 mg total) by mouth daily., Disp: 30 capsule, Rfl: 0    furosemide 40 MG Oral Tab, Take 1 tablet (40 mg total) by mouth daily. HOLD LASIX UNTIL FOLLOW-UP WITH PCP, Disp: , Rfl:     empagliflozin 10 MG Oral Tab, Take 1 tablet (10 mg total) by mouth daily. (Patient not taking: Reported on  6/26/2025), Disp: , Rfl:     allopurinol 100 MG Oral Tab, Take 1 tablet (100 mg total) by mouth daily., Disp: , Rfl:     acyclovir 200 MG Oral Cap, Take 1 capsule (200 mg total) by mouth 2 (two) times daily., Disp: , Rfl:     LORazepam 0.5 MG Oral Tab, Take 1 tablet (0.5 mg total) by mouth nightly as needed for Anxiety., Disp: , Rfl:     vitamin E 100 UNITS Oral Cap, Take 1 capsule (100 Units total) by mouth in the morning., Disp: , Rfl:     Ascorbic Acid (VITAMIN C) 100 MG Oral Tab, Take 1 tablet (100 mg total) by mouth in the morning., Disp: , Rfl:     rivaroxaban (XARELTO) 15 MG Oral Tab, Take 1 tablet (15 mg total) by mouth daily with food., Disp: 90 tablet, Rfl: 3    tamsulosin (FLOMAX) cap, TAKE 1 CAPSULE TWICE DAILY, Disp: 180 capsule, Rfl: 3    omega-3 fatty acids 1000 MG Oral Cap, Take 1,000 mg by mouth in the morning. (Patient not taking: Reported on 6/26/2025), Disp: , Rfl:     magnesium 250 MG Oral Tab, Take 1 tablet (250 mg total) by mouth., Disp: , Rfl:     Vitamin D3 2000 units Oral Cap, Take 1 capsule (2,000 Units total) by mouth in the morning. Every other day., Disp: , Rfl: 0    Red Yeast Rice Extract (RED YEAST RICE OR), One capsules BID, Disp: , Rfl:   [2]   Allergies  Allergen Reactions    Amlodipine      Pedal edema    Naprosyn [Naproxen] OTHER (SEE COMMENTS)     Instructed by pcp in [place], no naprosyn due to liver damage.   [3]   Past Medical History:   Atrial fibrillation (HCC)    Esophageal reflux    Hemorrhoids    HIGH BLOOD PRESSURE    Multiple myeloma (HCC)    RA (rheumatoid arthritis) (HCC)    Unspecified essential hypertension    Visual impairment    wears glasses for distance

## 2025-07-17 NOTE — PATIENT INSTRUCTIONS
PATIENT INSTRUCTIONS      FOR Oswaldo Piedra ,  1940    DATE OF SERVICE: 2025    Start antibiotics, will send to your pharmacy    Enluxtra into the wound and stack with a rectangular piece both with backing off    Kerramax  Gauze roll  Coflex lite 20 to 30 mmHg compression wrap to left lower extremity, may advance after nurse visit 30 to 40 mmHg if tolerating well      DO NOT get the compression wrap wet, use a cast cover or shower boot which can be purchased at a pharmacy    Home health to assist with dressing changes 1-2 times per week    Nurse visit tomorrow and then weekly x 3 weeks      Follow up with Dr. Castillo 4 WEEKS      Managing your edema:    Avoid prolonged standing in one place. It is better to have your calf muscles moving to pump fluid out of the legs.  Elevate leg(s) above the level of the heart when sitting or as much as possible.  Take you diuretics as directed by your physician. Do not skip doses or change doses unless instructed to do so by your physician.  Decrease salt intake, follow recommended 2 grams daily.  Do not get leg(s) with compression wrap wet. If wraps are too tight as indicated by pain, numbness/tingling or discoloration of toes remove wrap completely and call the wound center @ [phone].       The treatment plan has been discussed at length between you and your provider. Follow all instructions carefully, it is very important. If you do not follow all instructions you are at risk of your wound not healing, infection, possible loss of limb and even loss of life.    COMPRESSION WRAP PATIENT CARE INSTRUCTIONS  DO NOT get compression wrap wet. When showering, use a shower boot.   Please contact wound clinic and if not able to reach, then go to emergency room if ANY of the following occur:   Tingling or numbness in the foot or toes on leg with compression wrap.  Severe pain that cannot be relieved with elevation and any medication instructed per your  doctor.  A fever of 100.4°F (38°C) or higher.  Swelling, coldness, or blue-gray discoloration of the toes.  If the compression wrap feels too tight or too loose.  If the compression wrap is damaged or has rough edges that hurt.  If the compression wrap gets wet, you must cut wrap off.   Drainage from compression wrap that smells different than usual.    MISCELLANEOUS INSTRUCTIONS  Supplement with a daily multivitamin   Low salt diet  Intense blood sugar control - Goal Blood sugar below 180 at all times recommended.  Increase protein intake / consider protein supplements - see below  Elevate extremities at all times when sitting / laying down.  No tobacco use     DIETARY MODIFICATIONS TO HELP WITH WOUND HEALING:          Please follow any restrictions to diet given by your other doctors     Protein: meats, beans, eggs, milk and yogurt particularly Greek yogurt), tofu, soy nuts, soy protein products     Vitamin C: citrus fruits and juices, strawberries, tomatoes, tomato juice, peppers, baked potatoes, spinach, broccoli, cauliflower, [place] sprouts, cabbage     Vitamin A: dark greens, leafy vegetables, orange or yellow vegetables, cantaloupe, fortified dairy products, liver, fortified cereals     Zinc: fortified cereals, red meats, seafood     Consider Elías by [employer] (These are essential branch chain amino acids that help with tissue building and wound healing) and take 2 packets/day. You can order online at [employer] or [employer].      Available at hospital pharmacy as well.      ADDITIONAL REMINDERS:     The treatment plan has been discussed at length with you and your provider. Follow all instructions carefully, it is very important. If you do not follow all instructions, you are at risk of your wound not healing, infection, possible loss of limb and even loss of life.  Please call the clinic at [phone] during regular business hours ( 7:30 AM - 5:30 PM) if you notice increased redness, warmth, pain or pus  like drainage or start running a fever greater than 100.3.    For after hour emergencies, please call your primary physician or go to the nearest emergency room.  If your blood pressure is elevated, follow up with your primary care doctor and/or cardiologist as soon as possible.

## 2025-07-18 ENCOUNTER — OFFICE VISIT (OUTPATIENT)
Dept: WOUND CARE | Facility: HOSPITAL | Age: 85
End: 2025-07-18
Attending: FAMILY MEDICINE
Payer: MEDICARE

## 2025-07-18 VITALS
TEMPERATURE: 98 F | SYSTOLIC BLOOD PRESSURE: 120 MMHG | HEART RATE: 78 BPM | DIASTOLIC BLOOD PRESSURE: 56 MMHG | RESPIRATION RATE: 16 BRPM

## 2025-07-18 DIAGNOSIS — L97.522 NON-PRESSURE CHRONIC ULCER OF OTHER PART OF LEFT FOOT WITH FAT LAYER EXPOSED (HCC): Primary | ICD-10-CM

## 2025-07-18 PROCEDURE — 29581 APPL MULTLAYER CMPRN SYS LEG: CPT

## 2025-07-18 NOTE — PROGRESS NOTES
Chief Complaint   Patient presents with    Wound Care     Patient here for nurse visit to left dorsal foot wound. Pt denies any concerns or issues, pt denies any pain in wound at this time.          Medications - Current[1]    Allergies[2]       HISTORY:     Past medical, surgical, family and social history updated where appropriate.    PHYSICAL EXAM:   /56   Pulse 78   Temp 97.8 °F (36.6 °C)   Resp 16        Vital signs reviewed.      Calf  Point of Measurement - Left Calf: 39     Left Calf from:: Heel  Calf Left cm:: 41          Ankle  Point of Measurement - Left Ankle: 12     Left Ankle from:: Heel  Left Ankle cm:: 27                Wound 06/26/25 #1 Left dorsal foot Foot Dorsal;Left (Active)   Date First Assessed/Time First Assessed: 06/26/25 0943    Wound Number (Wound Clinic Only): #1 Left dorsal foot  Primary Wound Type: Edema  Location: Foot  Wound Location Orientation: Dorsal;Left      Assessments 6/26/2025  9:44 AM 7/18/2025 10:46 AM   Wound Image       Drainage Amount Moderate Small   Drainage Description Serous;Yellow Serous;Yellow   Treatments -- Compression   Wound Length (cm) 3.7 cm 3.5 cm   Wound Width (cm) 3.3 cm 3.5 cm   Wound Surface Area (cm^2) 9.59 cm^2 9.62 cm^2   Wound Depth (cm) 0.3 cm 0.2 cm   Wound Volume (cm^3) 1.918 cm^3 1.283 cm^3   Wound Healing % -- 33   Margins Well-defined edges Well-defined edges   Non-staged Wound Description Full thickness Full thickness   Nica-wound Assessment Blanchable erythema;Edema Edema   Wound Granulation Tissue -- Pink;Firm   Wound Bed Granulation (%) -- 10 %   Wound Bed Slough (%) 100 % 90 %   Wound Odor None None   Tunneling? No --   Undermining? No --   Sinus Tracts? No --       Inactive Orders   Date Order Priority Status Authorizing Provider   06/26/25 1218 Debridement Edema Dorsal;Left Foot Routine Completed Khanh Castillo MD       Compression Wrap 07/17/25 Dorsal;Left (Active)   Placement Date/Time: 07/17/25 1445   Wound Location  Orientation: Dorsal;Left      Assessments 7/17/2025  2:04 PM 7/18/2025 10:46 AM   Response to Treatment Well tolerated Well tolerated   Compression Layers Multilayer Multilayer   Compression Product Type Unna Boot Unna Boot   Dressing Applied Yes Yes   Compression Wrap Location Toes to Knee Toes to Knee   Compression Wrap Status Clean;Intact;Dry Clean;Intact;Dry       No associated orders.          ASSESSMENT AND PLAN:        Risks, benefits, and alternatives of current treatment plan discussed in detail.  Questions and concerns addressed. Red flags to RTC or ED reviewed.  Patient (or parent) agrees to plan.      No follow-ups on file.  Weekly Wound Education Note    Teaching Provided To: Patient, Family (niece)  Training Topics: Cleasing and general instructions, Compression, Discharge instructions, Edema control, Dressing  Training Method: Demonstration, Explain/Verbal  Training Response: Reinforcement needed        Notes: Patient here for nurse visit to left dorsal foot wound. Pt denies any concerns or issues, pt denies any pain in wound at this time. Removed old dressing/wrap, reapplication of enluxtra (back off), kerramax, conforming gauze,tape. Applied calamine unna boot 20-30mmHg (due to 1st wrap change and patient was only in 20-30mmHg for one day), per providers orders if patient tolerates dressing at next nurse visit can increase to calamine unna boot 30-40mmHg. Patient to follow up on 7/24/25.      Davon TRAN RN   7/18/2025  11:35 AM            [1]   Current Outpatient Medications:     cephALEXin 500 MG Oral Cap, Take 1 capsule (500 mg total) by mouth 3 (three) times daily for 7 days., Disp: 21 capsule, Rfl: 0    lisinopril 2.5 MG Oral Tab, Take 1 tablet (2.5 mg total) by mouth daily., Disp: , Rfl:     Cholecalciferol 50 MCG (2000 UT) Oral Cap, Take by mouth., Disp: , Rfl:     vitamin E 400 UNITS Oral Cap, Take 1,000 Units by mouth daily., Disp: , Rfl:     acetaminophen 500 MG Oral Tab, Take 1 tablet (500  mg total) by mouth every 6 (six) hours as needed for Pain., Disp: , Rfl:     montelukast 10 MG Oral Tab, Take 1 tablet (10 mg total) by mouth nightly., Disp: , Rfl:     diphenhydrAMINE 25 MG Oral Cap, Take 1 capsule (25 mg total) by mouth every 6 (six) hours as needed for Itching., Disp: , Rfl:     cefadroxil 500 MG Oral Cap, Take by mouth 2 (two) times daily., Disp: , Rfl:     empagliflozin (JARDIANCE) 10 MG Oral Tab, Take by mouth daily., Disp: , Rfl:     dilTIAZem  MG Oral Capsule SR 24 Hr, Take 1 capsule (240 mg total) by mouth daily., Disp: 30 capsule, Rfl: 0    furosemide 40 MG Oral Tab, Take 1 tablet (40 mg total) by mouth daily. HOLD LASIX UNTIL FOLLOW-UP WITH PCP, Disp: , Rfl:     empagliflozin 10 MG Oral Tab, Take 1 tablet (10 mg total) by mouth daily. (Patient not taking: Reported on 6/26/2025), Disp: , Rfl:     allopurinol 100 MG Oral Tab, Take 1 tablet (100 mg total) by mouth daily., Disp: , Rfl:     acyclovir 200 MG Oral Cap, Take 1 capsule (200 mg total) by mouth 2 (two) times daily., Disp: , Rfl:     LORazepam 0.5 MG Oral Tab, Take 1 tablet (0.5 mg total) by mouth nightly as needed for Anxiety., Disp: , Rfl:     vitamin E 100 UNITS Oral Cap, Take 1 capsule (100 Units total) by mouth in the morning., Disp: , Rfl:     Ascorbic Acid (VITAMIN C) 100 MG Oral Tab, Take 1 tablet (100 mg total) by mouth in the morning., Disp: , Rfl:     rivaroxaban (XARELTO) 15 MG Oral Tab, Take 1 tablet (15 mg total) by mouth daily with food., Disp: 90 tablet, Rfl: 3    tamsulosin (FLOMAX) cap, TAKE 1 CAPSULE TWICE DAILY, Disp: 180 capsule, Rfl: 3    omega-3 fatty acids 1000 MG Oral Cap, Take 1,000 mg by mouth in the morning. (Patient not taking: Reported on 6/26/2025), Disp: , Rfl:     magnesium 250 MG Oral Tab, Take 1 tablet (250 mg total) by mouth., Disp: , Rfl:     Vitamin D3 2000 units Oral Cap, Take 1 capsule (2,000 Units total) by mouth in the morning. Every other day., Disp: , Rfl: 0    Red Yeast Rice Extract  (RED YEAST RICE OR), One capsules BID, Disp: , Rfl:   [2]   Allergies  Allergen Reactions    Amlodipine      Pedal edema    Naprosyn [Naproxen] OTHER (SEE COMMENTS)     Instructed by pcp in [place], no naprosyn due to liver damage.

## 2025-07-21 ENCOUNTER — APPOINTMENT (OUTPATIENT)
Dept: WOUND CARE | Facility: HOSPITAL | Age: 85
End: 2025-07-21
Payer: MEDICARE

## 2025-07-24 ENCOUNTER — OFFICE VISIT (OUTPATIENT)
Dept: WOUND CARE | Facility: HOSPITAL | Age: 85
End: 2025-07-24
Attending: FAMILY MEDICINE
Payer: MEDICARE

## 2025-07-24 ENCOUNTER — APPOINTMENT (OUTPATIENT)
Dept: WOUND CARE | Facility: HOSPITAL | Age: 85
End: 2025-07-24
Payer: MEDICARE

## 2025-07-24 VITALS
HEART RATE: 78 BPM | RESPIRATION RATE: 18 BRPM | TEMPERATURE: 98 F | DIASTOLIC BLOOD PRESSURE: 66 MMHG | SYSTOLIC BLOOD PRESSURE: 104 MMHG

## 2025-07-24 DIAGNOSIS — Z87.2 HISTORY OF CELLULITIS: ICD-10-CM

## 2025-07-24 DIAGNOSIS — L97.522 NON-PRESSURE CHRONIC ULCER OF OTHER PART OF LEFT FOOT WITH FAT LAYER EXPOSED (HCC): Primary | ICD-10-CM

## 2025-07-24 DIAGNOSIS — R60.0 EDEMA OF LEFT LOWER LEG: ICD-10-CM

## 2025-07-24 PROCEDURE — 99213 OFFICE O/P EST LOW 20 MIN: CPT

## 2025-07-24 NOTE — PROGRESS NOTES
Chief Complaint   Patient presents with    Wound Recheck     Here for follow up wound care Nurse visit for left dorsal foot, no sx of concern at this time, the wrap appeared to roll down so they trimmed the top. Denies pain at the wound. 1 more day of abx cepholexin left in to finish.          Medications - Current[1]    Allergies[2]       HISTORY:     Past medical, surgical, family and social history updated where appropriate.    PHYSICAL EXAM:   /66   Pulse 78   Temp 98.3 °F (36.8 °C)   Resp 18        Vital signs reviewed.      Calf  Point of Measurement - Left Calf: 39     Left Calf from:: Heel  Calf Left cm:: 42          Ankle  Point of Measurement - Left Ankle: 12     Left Ankle from:: Heel  Left Ankle cm:: 26                Wound 06/26/25 #1 Left dorsal foot Foot Dorsal;Left (Active)   Date First Assessed/Time First Assessed: 06/26/25 0943    Wound Number (Wound Clinic Only): #1 Left dorsal foot  Primary Wound Type: Edema  Location: Foot  Wound Location Orientation: Dorsal;Left      Assessments 6/26/2025  9:44 AM 7/24/2025  1:18 PM   Wound Image       Drainage Amount Moderate Moderate   Drainage Description Serous;Yellow Serosanguineous   Treatments -- Compression   Wound Length (cm) 3.7 cm 3.4 cm   Wound Width (cm) 3.3 cm 3.2 cm   Wound Surface Area (cm^2) 9.59 cm^2 8.55 cm^2   Wound Depth (cm) 0.3 cm 0.3 cm   Wound Volume (cm^3) 1.918 cm^3 1.709 cm^3   Wound Healing % -- 11   Margins Well-defined edges Well-defined edges   Non-staged Wound Description Full thickness Full thickness   Nica-wound Assessment Blanchable erythema;Edema Edema;Blanchable erythema   Wound Granulation Tissue -- Spongy;Red   Wound Bed Granulation (%) -- 10 %   Wound Bed Slough (%) 100 % 90 %   Wound Odor None None   Tunneling? No --   Undermining? No --   Sinus Tracts? No --       Inactive Orders   Date Order Priority Status Authorizing Provider   06/26/25 1218 Debridement Edema Dorsal;Left Foot Routine Completed Jonathan  MD Khanh       Compression Wrap 07/17/25 Dorsal;Left (Active)   Placement Date/Time: 07/17/25 1445   Wound Location Orientation: Dorsal;Left      Assessments 7/17/2025  2:04 PM 7/24/2025  1:18 PM   Response to Treatment Well tolerated Well tolerated   Compression Layers Multilayer Multilayer   Compression Product Type Unna Boot Unna Boot   Dressing Applied Yes Yes   Compression Wrap Location Toes to Knee Toes to Knee   Compression Wrap Status Clean;Intact;Dry Clean;Intact;Dry       No associated orders.          ASSESSMENT AND PLAN:        Risks, benefits, and alternatives of current treatment plan discussed in detail.  Questions and concerns addressed. Red flags to RTC or ED reviewed.  Patient (or parent) agrees to plan.      Return in about 1 week (around 7/31/2025) for RN visit for 30 min.  Weekly Wound Education Note    Teaching Provided To: Patient  Training Topics: Cleasing and general instructions, Dressing, Nutrition, Edema control  Training Method: Demonstration, Explain/Verbal  Training Response: Patient responds and understands        Notes: Nurse visit today. Per last provider recommendation applied enluxtra (back off), kerramax, conforming gauze,tape. Patient tolerates dressing well applied calamine unna boot 30-40mmHg toes to knee.               Eleanor CUMMINGS RN   7/24/2025  2:10 PM             [1]   Current Outpatient Medications:     cephALEXin 500 MG Oral Cap, Take 1 capsule (500 mg total) by mouth 3 (three) times daily for 7 days., Disp: 21 capsule, Rfl: 0    lisinopril 2.5 MG Oral Tab, Take 1 tablet (2.5 mg total) by mouth daily., Disp: , Rfl:     Cholecalciferol 50 MCG (2000 UT) Oral Cap, Take by mouth., Disp: , Rfl:     vitamin E 400 UNITS Oral Cap, Take 1,000 Units by mouth daily., Disp: , Rfl:     acetaminophen 500 MG Oral Tab, Take 1 tablet (500 mg total) by mouth every 6 (six) hours as needed for Pain., Disp: , Rfl:     montelukast 10 MG Oral Tab, Take 1 tablet (10 mg total) by mouth  nightly., Disp: , Rfl:     diphenhydrAMINE 25 MG Oral Cap, Take 1 capsule (25 mg total) by mouth every 6 (six) hours as needed for Itching., Disp: , Rfl:     cefadroxil 500 MG Oral Cap, Take by mouth 2 (two) times daily., Disp: , Rfl:     empagliflozin (JARDIANCE) 10 MG Oral Tab, Take by mouth daily., Disp: , Rfl:     dilTIAZem  MG Oral Capsule SR 24 Hr, Take 1 capsule (240 mg total) by mouth daily., Disp: 30 capsule, Rfl: 0    furosemide 40 MG Oral Tab, Take 1 tablet (40 mg total) by mouth daily. HOLD LASIX UNTIL FOLLOW-UP WITH PCP, Disp: , Rfl:     empagliflozin 10 MG Oral Tab, Take 1 tablet (10 mg total) by mouth daily. (Patient not taking: Reported on 6/26/2025), Disp: , Rfl:     allopurinol 100 MG Oral Tab, Take 1 tablet (100 mg total) by mouth daily., Disp: , Rfl:     acyclovir 200 MG Oral Cap, Take 1 capsule (200 mg total) by mouth 2 (two) times daily., Disp: , Rfl:     LORazepam 0.5 MG Oral Tab, Take 1 tablet (0.5 mg total) by mouth nightly as needed for Anxiety., Disp: , Rfl:     vitamin E 100 UNITS Oral Cap, Take 1 capsule (100 Units total) by mouth in the morning., Disp: , Rfl:     Ascorbic Acid (VITAMIN C) 100 MG Oral Tab, Take 1 tablet (100 mg total) by mouth in the morning., Disp: , Rfl:     rivaroxaban (XARELTO) 15 MG Oral Tab, Take 1 tablet (15 mg total) by mouth daily with food., Disp: 90 tablet, Rfl: 3    tamsulosin (FLOMAX) cap, TAKE 1 CAPSULE TWICE DAILY, Disp: 180 capsule, Rfl: 3    omega-3 fatty acids 1000 MG Oral Cap, Take 1,000 mg by mouth in the morning. (Patient not taking: Reported on 6/26/2025), Disp: , Rfl:     magnesium 250 MG Oral Tab, Take 1 tablet (250 mg total) by mouth., Disp: , Rfl:     Vitamin D3 2000 units Oral Cap, Take 1 capsule (2,000 Units total) by mouth in the morning. Every other day., Disp: , Rfl: 0    Red Yeast Rice Extract (RED YEAST RICE OR), One capsules BID, Disp: , Rfl:   [2]   Allergies  Allergen Reactions    Amlodipine      Pedal edema    Naprosyn  [Naproxen] OTHER (SEE COMMENTS)     Instructed by pcp in [place], no naprosyn due to liver damage.

## 2025-07-31 ENCOUNTER — APPOINTMENT (OUTPATIENT)
Dept: WOUND CARE | Facility: HOSPITAL | Age: 85
End: 2025-07-31
Payer: MEDICARE

## 2025-07-31 ENCOUNTER — APPOINTMENT (OUTPATIENT)
Dept: WOUND CARE | Facility: HOSPITAL | Age: 85
End: 2025-07-31
Attending: FAMILY MEDICINE
Payer: MEDICARE

## 2025-07-31 VITALS
DIASTOLIC BLOOD PRESSURE: 64 MMHG | SYSTOLIC BLOOD PRESSURE: 118 MMHG | RESPIRATION RATE: 16 BRPM | HEART RATE: 79 BPM | TEMPERATURE: 98 F

## 2025-07-31 DIAGNOSIS — L97.521 CHRONIC ULCER OF LEFT FOOT LIMITED TO BREAKDOWN OF SKIN (HCC): Primary | ICD-10-CM

## 2025-07-31 DIAGNOSIS — R60.0 EDEMA OF LEFT LOWER LEG: ICD-10-CM

## 2025-07-31 DIAGNOSIS — Z87.2 HISTORY OF CELLULITIS: ICD-10-CM

## 2025-07-31 PROCEDURE — 11042 DBRDMT SUBQ TIS 1ST 20SQCM/<: CPT | Performed by: NURSE PRACTITIONER

## 2025-08-07 ENCOUNTER — OFFICE VISIT (OUTPATIENT)
Dept: WOUND CARE | Facility: HOSPITAL | Age: 85
End: 2025-08-07
Attending: FAMILY MEDICINE

## 2025-08-07 ENCOUNTER — APPOINTMENT (OUTPATIENT)
Dept: WOUND CARE | Facility: HOSPITAL | Age: 85
End: 2025-08-07

## 2025-08-07 VITALS
RESPIRATION RATE: 16 BRPM | HEART RATE: 77 BPM | SYSTOLIC BLOOD PRESSURE: 101 MMHG | DIASTOLIC BLOOD PRESSURE: 64 MMHG | TEMPERATURE: 99 F

## 2025-08-07 DIAGNOSIS — L97.522 NON-PRESSURE CHRONIC ULCER OF OTHER PART OF LEFT FOOT WITH FAT LAYER EXPOSED (HCC): ICD-10-CM

## 2025-08-07 DIAGNOSIS — R60.0 EDEMA OF LEFT LOWER LEG: ICD-10-CM

## 2025-08-07 DIAGNOSIS — L97.521 CHRONIC ULCER OF LEFT FOOT LIMITED TO BREAKDOWN OF SKIN (HCC): Primary | ICD-10-CM

## 2025-08-07 DIAGNOSIS — Z87.2 HISTORY OF CELLULITIS: ICD-10-CM

## 2025-08-07 PROCEDURE — 29581 APPL MULTLAYER CMPRN SYS LEG: CPT

## 2025-08-14 ENCOUNTER — OFFICE VISIT (OUTPATIENT)
Dept: WOUND CARE | Facility: HOSPITAL | Age: 85
End: 2025-08-14
Attending: FAMILY MEDICINE

## 2025-08-14 VITALS
RESPIRATION RATE: 16 BRPM | HEART RATE: 64 BPM | TEMPERATURE: 98 F | DIASTOLIC BLOOD PRESSURE: 73 MMHG | SYSTOLIC BLOOD PRESSURE: 119 MMHG

## 2025-08-14 DIAGNOSIS — L97.521 CHRONIC ULCER OF LEFT FOOT LIMITED TO BREAKDOWN OF SKIN (HCC): Primary | ICD-10-CM

## 2025-08-14 DIAGNOSIS — I48.20 ATRIAL FIBRILLATION, CHRONIC (HCC): ICD-10-CM

## 2025-08-14 DIAGNOSIS — M06.09 RHEUMATOID ARTHRITIS OF MULTIPLE SITES WITH NEGATIVE RHEUMATOID FACTOR (HCC): ICD-10-CM

## 2025-08-14 DIAGNOSIS — Z87.2 HISTORY OF CELLULITIS: ICD-10-CM

## 2025-08-14 DIAGNOSIS — N18.30 STAGE 3 CHRONIC KIDNEY DISEASE, UNSPECIFIED WHETHER STAGE 3A OR 3B CKD (HCC): ICD-10-CM

## 2025-08-14 DIAGNOSIS — C90.00 MULTIPLE MYELOMA, REMISSION STATUS UNSPECIFIED (HCC): ICD-10-CM

## 2025-08-14 DIAGNOSIS — I10 PRIMARY HYPERTENSION: ICD-10-CM

## 2025-08-14 DIAGNOSIS — R60.0 EDEMA OF LEFT LOWER LEG: ICD-10-CM

## 2025-08-14 PROCEDURE — 99215 OFFICE O/P EST HI 40 MIN: CPT | Performed by: FAMILY MEDICINE

## 2025-08-21 ENCOUNTER — OFFICE VISIT (OUTPATIENT)
Dept: WOUND CARE | Facility: HOSPITAL | Age: 85
End: 2025-08-21
Attending: FAMILY MEDICINE

## 2025-08-21 VITALS
SYSTOLIC BLOOD PRESSURE: 113 MMHG | HEART RATE: 78 BPM | TEMPERATURE: 98 F | RESPIRATION RATE: 18 BRPM | DIASTOLIC BLOOD PRESSURE: 65 MMHG

## 2025-08-21 DIAGNOSIS — N18.30 STAGE 3 CHRONIC KIDNEY DISEASE, UNSPECIFIED WHETHER STAGE 3A OR 3B CKD (HCC): ICD-10-CM

## 2025-08-21 DIAGNOSIS — M06.09 RHEUMATOID ARTHRITIS OF MULTIPLE SITES WITH NEGATIVE RHEUMATOID FACTOR (HCC): ICD-10-CM

## 2025-08-21 DIAGNOSIS — L97.521 CHRONIC ULCER OF LEFT FOOT LIMITED TO BREAKDOWN OF SKIN (HCC): Primary | ICD-10-CM

## 2025-08-21 DIAGNOSIS — C90.00 MULTIPLE MYELOMA, REMISSION STATUS UNSPECIFIED (HCC): ICD-10-CM

## 2025-08-21 DIAGNOSIS — Z87.2 HISTORY OF CELLULITIS: ICD-10-CM

## 2025-08-21 DIAGNOSIS — R60.0 EDEMA OF LEFT LOWER LEG: ICD-10-CM

## 2025-08-21 PROCEDURE — 99214 OFFICE O/P EST MOD 30 MIN: CPT | Performed by: FAMILY MEDICINE

## 2025-08-28 ENCOUNTER — APPOINTMENT (OUTPATIENT)
Dept: PODIATRY | Age: 85
End: 2025-08-28

## 2025-08-28 ENCOUNTER — OFFICE VISIT (OUTPATIENT)
Dept: WOUND CARE | Facility: HOSPITAL | Age: 85
End: 2025-08-28
Attending: FAMILY MEDICINE

## 2025-08-28 VITALS
DIASTOLIC BLOOD PRESSURE: 71 MMHG | SYSTOLIC BLOOD PRESSURE: 118 MMHG | HEART RATE: 88 BPM | RESPIRATION RATE: 16 BRPM | TEMPERATURE: 98 F

## 2025-08-28 DIAGNOSIS — R60.0 EDEMA OF LEFT LOWER LEG: ICD-10-CM

## 2025-08-28 DIAGNOSIS — N18.30 STAGE 3 CHRONIC KIDNEY DISEASE, UNSPECIFIED WHETHER STAGE 3A OR 3B CKD (HCC): ICD-10-CM

## 2025-08-28 DIAGNOSIS — L97.521 CHRONIC ULCER OF LEFT FOOT LIMITED TO BREAKDOWN OF SKIN (HCC): Primary | ICD-10-CM

## 2025-08-28 DIAGNOSIS — M06.09 RHEUMATOID ARTHRITIS OF MULTIPLE SITES WITH NEGATIVE RHEUMATOID FACTOR (HCC): ICD-10-CM

## 2025-08-28 PROCEDURE — 99214 OFFICE O/P EST MOD 30 MIN: CPT | Performed by: FAMILY MEDICINE

## 2025-10-01 ENCOUNTER — APPOINTMENT (OUTPATIENT)
Dept: PODIATRY | Age: 85
End: 2025-10-01

## (undated) NOTE — LETTER
Date: 6/26/2025  Patient name: Oswaldo Piedra  YOB: 1940  Medical Record Number: [id number]  Primary Coverage: Payor: MEDICARE / Plan: MEDICARE PART A&B / Product Type: *No Product type* /   Secondary Coverage: [facility] HEALTH BENEFIT  Insurance ID: [id number]  Patient Address: [address] [place] [address]  Telephone Information:   Home Phone [phone]   Work Phone [phone]   Mobile [phone]       Encounter Date: 6/26/2025  Provider: Khanh Castillo MD  Diagnosis: No diagnosis found.      Wound 06/11/25 Sacrum Inner (Active)   Date First Assessed/Time First Assessed: 06/11/25 2100   Present on Original Admission: Yes  Primary Wound Type: Pressure Injury  Location: Sacrum  Wound Location Orientation: Inner  Wound Description (Comments): excoriation, superficial open ulcer      Assessments 6/11/2025  9:00 PM 6/19/2025  8:32 AM   Wound Image      Site Assessment Dry --   Drainage Amount None --   Treatments Cleansed --   Dressing Mepilex Open to air   Dressing Changed Changed --   Dressing Status Clean;Dry;Intact --   Pressure Injury Stage Stage 2 --       No associated orders.       Wound 06/11/25 Foot Dorsal;Left (Active)   Date First Assessed/Time First Assessed: 06/11/25 2100   Present on Original Admission: Yes  Primary Wound Type: (c) Other (comment)  Location: Foot  Wound Location Orientation: Dorsal;Left  Wound Description (Comments): wound bed almost covered with ...      Assessments 6/11/2025  9:00 PM 6/19/2025  1:35 PM   Wound Image       Site Assessment Edema;Fragile;Moist;Tan;Yellow Unable to assess   Drainage Amount Scant --   Drainage Description Serosanguineous --   Dressing Aquacel;4x4s Ace bandage   Dressing Changed Changed --   Dressing Status Intact;Dry;Clean Dry;Clean;Intact   Tunneling? No --       Inactive Orders   Date Order Priority Status Authorizing Provider   06/19/25 1430 Wound care Routine Discontinued Ana Botello MD   06/18/25  1412 Wound care Routine Discontinued Markell Johnson MD   06/12/25 0028 Consult to Wound Ostomy Routine Completed Markell Johnson MD     - Reason for Consult:    Wound Care     - Wound Care Reason for Consult:    wound care       Wound 06/26/25 #1 Left dorsal foot Foot Dorsal;Left (Active)   Date First Assessed/Time First Assessed: 06/26/25 0943    Wound Number (Wound Clinic Only): #1 Left dorsal foot  Primary Wound Type: Edema  Location: Foot  Wound Location Orientation: Dorsal;Left      Assessments 6/26/2025  9:44 AM 6/26/2025 10:34 AM   Wound Image       Drainage Amount Moderate --   Drainage Description Serous;Yellow --   Wound Length (cm) 3.7 cm --   Wound Width (cm) 3.3 cm --   Wound Surface Area (cm^2) 9.59 cm^2 --   Wound Depth (cm) 0.3 cm --   Wound Volume (cm^3) 1.918 cm^3 --   Margins Well-defined edges --   Non-staged Wound Description Full thickness --   Nica-wound Assessment Blanchable erythema;Edema --   Wound Bed Slough (%) 100 % --   Wound Odor None --   Tunneling? No --   Undermining? No --   Sinus Tracts? No --       Active Orders   Date Order Priority Status Authorizing Provider   06/26/25 1218 Debridement Edema Dorsal;Left Foot Routine Active Khanh Castillo MD           Wound Number: All wounds    Wound Cleaning and Dressings:  Showering directions: May shower with protection  Wound cleansing:  Cleanse with normal saline or wound cleanser  Wound cleaning frequency: Daily  Wound product: Other Cleanse wound with normal saline, apply zinc based barrier cream to periwound, honey gel to wound bed covered with 4x4 bordered gauze dressing   Dressing change frequency:  Change dressing daily and/or PRN  Enzymatic agent:  Honey    Miscellaneous/Additional Orders:  Miscellaneous orders: Home health care nurse for wound care    Care Summary:  Care Summary: Discussed Plan of Care at beside with patient. Patient verbally acknowledges understanding of all instructions and all questions were  answered.    Follow Up:  Return in about 1 week (around 7/3/2025).      Additional Notes:  Home Health RN to assess patient 1-2 times weekly     Home Health RN to place order for dressing supplies so that patient/family has dressing for the days staff is not there to change wound           PATIENT INSTRUCTIONS      FOR Oswaldo Piedra ,  1940    DATE OF SERVICE: 2025      Apply Medihoney gel(or Manukapli gel) to the wound base    Cover with border gauze dressing    Change on a daily basis    DO NOT get the wound wet, use a cast cover or shower boot which can be purchased at a pharmacy    Home health to assist with dressing changes 1-2 times per week    Follow up with Dr. Castillo 1 WEEK        MISCELLANEOUS INSTRUCTIONS  Supplement with a daily multivitamin   Low salt diet  Intense blood sugar control - Goal Blood sugar below 180 at all times recommended.  Increase protein intake / consider protein supplements - see below  Elevate extremities at all times when sitting / laying down.  No tobacco use     DIETARY MODIFICATIONS TO HELP WITH WOUND HEALING:          Please follow any restrictions to diet given by your other doctors     Protein: meats, beans, eggs, milk and yogurt particularly Greek yogurt), tofu, soy nuts, soy protein products     Vitamin C: citrus fruits and juices, strawberries, tomatoes, tomato juice, peppers, baked potatoes, spinach, broccoli, cauliflower, [place] sprouts, cabbage     Vitamin A: dark greens, leafy vegetables, orange or yellow vegetables, cantaloupe, fortified dairy products, liver, fortified cereals     Zinc: fortified cereals, red meats, seafood     Consider Elías by [employer] (These are essential branch chain amino acids that help with tissue building and wound healing) and take 2 packets/day. You can order online at [employer] or [employer].      Available at hospital pharmacy as well.      ADDITIONAL REMINDERS:     The treatment plan has been discussed at length  with you and your provider. Follow all instructions carefully, it is very important. If you do not follow all instructions, you are at risk of your wound not healing, infection, possible loss of limb and even loss of life.  Please call the clinic at [phone] during regular business hours ( 7:30 AM - 5:30 PM) if you notice increased redness, warmth, pain or pus like drainage or start running a fever greater than 100.3.    For after hour emergencies, please call your primary physician or go to the nearest emergency room.  If your blood pressure is elevated, follow up with your primary care doctor and/or cardiologist as soon as possible.         Additional home health DME: No